# Patient Record
Sex: FEMALE | Race: WHITE | Employment: OTHER | URBAN - METROPOLITAN AREA
[De-identification: names, ages, dates, MRNs, and addresses within clinical notes are randomized per-mention and may not be internally consistent; named-entity substitution may affect disease eponyms.]

---

## 2019-10-10 ENCOUNTER — TRANSFERRED RECORDS (OUTPATIENT)
Dept: PHYSICAL THERAPY | Facility: CLINIC | Age: 84
End: 2019-10-10

## 2019-10-28 ENCOUNTER — THERAPY VISIT (OUTPATIENT)
Dept: PHYSICAL THERAPY | Facility: CLINIC | Age: 84
End: 2019-10-28
Payer: MEDICARE

## 2019-10-28 DIAGNOSIS — G89.29 CHRONIC LEFT-SIDED LOW BACK PAIN WITHOUT SCIATICA: ICD-10-CM

## 2019-10-28 DIAGNOSIS — M54.50 CHRONIC LEFT-SIDED LOW BACK PAIN WITHOUT SCIATICA: ICD-10-CM

## 2019-10-28 PROCEDURE — 97163 PT EVAL HIGH COMPLEX 45 MIN: CPT | Mod: GP | Performed by: PHYSICAL THERAPIST

## 2019-10-28 PROCEDURE — 97112 NEUROMUSCULAR REEDUCATION: CPT | Mod: GP | Performed by: PHYSICAL THERAPIST

## 2019-10-28 NOTE — PROGRESS NOTES
[facility] Initial Evaluation  Subjective:  The history is provided by the patient and a relative (daughter, Heather). 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 No  was used.   Lilliana Mcneal being seen for low back pain.   Problem began 10/10/2019 (date of MD order). Problem occurred: unknown reasons.  Noticed gradual onset of LBP approximately 2 months ago  General health as reported by patient is good. Pertinent medical history includes:  Smoking, stroke, sleep disorder/apnea, implanted device and other (A-Fib, Pacemaker, R hemiplegia).  Medical allergies: none.  Surgeries include:  None.  Current medications:  Heparin/coumadin, bone density, high blood pressure medication and pain medication.     Pain is described as sharp and stabbing (rated as 8/10) and is intermittent. Pain is the same all the time. Since onset symptoms are unchanged. Special tests:  X-ray (severe scoliosis and DJD).     Patient is Retired; very sedentary; spends a lot of time sitting.   Barriers include:  Requires assistance with ADL's (lives in her own home with daughter ).  Red flags:  None as reported by patient.    This is a chronic condition    Patient reports pain:  Lumbar spine left. Radiates to:  No radiation.  Symptoms are exacerbated by sitting and relieved by heat (Tramadol).    Patient's Goals/Expectations: to get rid of some of the pain                  Objective:  Standing Alignment:    Cervical/Thoracic:  Forward head and thoracic kyphosis increased (severe convex R thoracic scoliosis; left rib appears to be abutting into left iliac crest when sitting and standing)  Shoulder/upper extremity deviations alignment: severe subluxation of R shoulder with + sulcus sign; RUE posturing in shoulder IR, forearm pronation, and wrist flexion.  Lumbar:  Lordosis decr  Pelvic:  Iliac crest high R and PSIS high R          Gait:  Patient presented to the clinic in a WC;  "reportedly uses a walker in the home.  Requires 1-2 person assist with transfers due to R hemiplegia.  Wears right AFO           Lumbar AROM: deferred    Hip PROM: WFL bilateral LEs    Palpation: non-specific tenderness right low back and flank region just above iliac crest; pain alleviated with correction into more \"upright\" posture using towel roll    Physical Exam    General     ROS    Assessment/Plan:    Patient is a 87 year old female with lumbar complaints.    Patient has the following significant findings with corresponding treatment plan.                Diagnosis 1:  LBP   Pain -  self management, education and home program  Decreased function - home program  Decreased Strength - therapeutic exercises  Impaired posture - neuro re-education    Therapy Evaluation Codes:   1) History comprised of:   Personal factors that impact the plan of care:      Age and Cognition.    Comorbidity factors that impact the plan of care are:      Heart problems, Implanted device, Sleep disorder/apnea, Smoking, Stroke, Weakness and R hemiplegia.     Medications impacting care: Bone density, High blood pressure and Heparin/coumadin.  2) Examination of Body Systems comprised of:   Body structures and functions that impact the plan of care:      Lumbar spine.   Activity limitations that impact the plan of care are:      Sitting, Standing and Walking.  3) Clinical presentation characteristics are:   Evolving/Changing.  4) Decision-Making    High complexity using standardized patient assessment instrument and/or measureable assessment of functional outcome.  Cumulative Therapy Evaluation is: High complexity.    Previous and current functional limitations:  (See Goal Flow Sheet for this information)    Short term and Long term goals: (See Goal Flow Sheet for this information)     Communication ability:  Patient appears to be able to clearly communicate and understand verbal and written communication and follow directions " correctly.  Treatment Explanation - The following has been discussed with the patient:    RX ordered/plan of care  Anticipated outcomes  Possible risks and side effects  This patient would benefit from PT intervention to resume normal activities.   Rehab potential is fair.    Frequency:  1 X week, once daily  Duration:  for 6 weeks  Discharge Plan:  Achieve all LTG.  Independent in home treatment program.  Reach maximal therapeutic benefit.    Please refer to the daily flowsheet for treatment today, total treatment time and time spent performing 1:1 timed codes.

## 2019-10-28 NOTE — LETTER
DEPARTMENT OF HEALTH AND HUMAN SERVICES  [facility] MEDICA[facility] SERVICES    PLAN/UPDATED PLAN OF PROGRESS FOR OUTPATIENT REHABILITATION    PATIENTS NAME:  Lilliana Mcneal   : 1932  PROVIDER NUMBER:    [id number]  HICN:   [id number]  PROVIDER NAME: DASIA KITCHEN  MEDICAL RECORD NUMBER: [id number]   START OF CARE DATE:  SOC Date: 10/28/19   TYPE:  PT  PRIMARY/TREATMENT DIAGNOSIS: (Pertinent Medical Diagnosis)  Chronic left-sided low back pain without sciatica    VISITS FROM START OF CARE:  Rxs Used: 1     [facility] Initial Evaluation  Subjective:  The history is provided by the patient and a relative (Heather zuniga). 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 No  was used.   Lilliana Mcneal being seen for low back pain.   Problem began 10/10/2019 (date of MD order). Problem occurred: unknown reasons.  Noticed gradual onset of LBP approximately 2 months ago  General health as reported by patient is good. Pertinent medical history includes:  Smoking, stroke, sleep disorder/apnea, implanted device and other (A-Fib, Pacemaker, R hemiplegia).  Medical allergies: none.  Surgeries include:  None.  Current medications:  Heparin/coumadin, bone density, high blood pressure medication and pain medication.  Pain is described as sharp and stabbing (rated as 8/10) and is intermittent. Pain is the same all the time. Since onset symptoms are unchanged. Special tests:  X-ray (severe scoliosis and DJD). Patient is Retired; very sedentary; spends a lot of time sitting.   Barriers include:  Requires assistance with ADL's (lives in her own home with daughter ).  Red flags:  None as reported by patient.  This is a chronic condition    Patient reports pain:  Lumbar spine left. Radiates to:  No radiation.  Symptoms are exacerbated by sitting and relieved by heat (Tramadol).    Patient's Goals/Expectations: to get rid of some of the pain               "  Objective:  Standing Alignment:    Cervical/Thoracic:  Forward head and thoracic kyphosis increased (severe convex R thoracic scoliosis; left rib appears to be abutting into left iliac crest when sitting and standing)  Shoulder/upper extremity deviations alignment: severe subluxation of R shoulder with + sulcus sign; RUE posturing in shoulder IR, forearm pronation, and wrist flexion.  Lumbar:  Lordosis decr  Pelvic:  Iliac crest high R and PSIS high R  Gait:  Patient presented to the clinic in a WC; reportedly uses a walker in the home.  Requires 1-2 person assist with transfers due to R hemiplegia.  Wears right AFO   Lumbar AROM: deferred  Hip PROM: WFL bilateral LEs  Palpation: non-specific tenderness right low back and flank region just above iliac crest; pain alleviated with correction into more \"upright\" posture using towel roll      PATIENTS NAME:  Lilliana Mcneal   : 1932    Assessment/Plan:    Patient is a 87 year old female with lumbar complaints.    Patient has the following significant findings with corresponding treatment plan.                Diagnosis 1:  LBP   Pain -  self management, education and home program  Decreased function - home program  Decreased Strength - therapeutic exercises  Impaired posture - neuro re-education    Therapy Evaluation Codes:   1) History comprised of:   Personal factors that impact the plan of care:      Age and Cognition.    Comorbidity factors that impact the plan of care are:      Heart problems, Implanted device, Sleep disorder/apnea, Smoking, Stroke, Weakness and R hemiplegia.     Medications impacting care: Bone density, High blood pressure and Heparin/coumadin.  2) Examination of Body Systems comprised of:   Body structures and functions that impact the plan of care:      Lumbar spine.   Activity limitations that impact the plan of care are:      Sitting, Standing and Walking.  3) Clinical presentation characteristics " "are:   Evolving/Changing.  4) Decision-Making    High complexity using standardized patient assessment instrument and/or measureable assessment of functional outcome.  Cumulative Therapy Evaluation is: High complexity.  Previous and current functional limitations:  (See Goal Flow Sheet for this information)    Short term and Long term goals: (See Goal Flow Sheet for this information)   Communication ability:  Patient appears to be able to clearly communicate and understand verbal and written communication and follow directions correctly.  Treatment Explanation - The following has been discussed with the patient:    RX ordered/plan of care  Anticipated outcomes  Possible risks and side effects  This patient would benefit from PT intervention to resume normal activities.   Rehab potential is fair.  Frequency:  1 X week, once daily  Duration:  for 6 weeks  Discharge Plan:  Achieve all LTG.  Independent in home treatment program.  Reach maximal therapeutic benefit.        Caregiver Signature/Credentials _____________________________ Date ________      Treating Provider: Vero Stokes ,PT, OCS   I have reviewed and certified the need for these services and plan of treatment while under my care.        PHYSICIAN'S SIGNATURE:   _________________________________________  Date___________   Nabil Hatch M.D.    Certification period:  Beginning of Cert date period: 10/28/19 to  End of Cert period date: 12/27/19     Functional Level Progress Report: Please see attached \"Goal Flow sheet for Functional level.\"    ____X____ Continue Services or       ________ DC Services                Service dates: From  SOC Date: 10/28/19 date to present                         "

## 2019-12-18 PROBLEM — M54.50 CHRONIC LEFT-SIDED LOW BACK PAIN WITHOUT SCIATICA: Status: RESOLVED | Noted: 2019-10-28 | Resolved: 2019-12-18

## 2019-12-18 PROBLEM — G89.29 CHRONIC LEFT-SIDED LOW BACK PAIN WITHOUT SCIATICA: Status: RESOLVED | Noted: 2019-10-28 | Resolved: 2019-12-18

## 2021-02-14 ENCOUNTER — HEALTH MAINTENANCE LETTER (OUTPATIENT)
Age: 86
End: 2021-02-14

## 2021-02-18 ENCOUNTER — IMMUNIZATION (OUTPATIENT)
Dept: PEDIATRICS | Facility: CLINIC | Age: 86
End: 2021-02-18
Payer: MEDICARE

## 2021-02-18 PROCEDURE — 0001A PR COVID VAC PFIZER DIL RECON 30 MCG/0.3 ML IM: CPT

## 2021-02-18 PROCEDURE — 91300 PR COVID VAC PFIZER DIL RECON 30 MCG/0.3 ML IM: CPT

## 2021-03-11 ENCOUNTER — IMMUNIZATION (OUTPATIENT)
Dept: PEDIATRICS | Facility: CLINIC | Age: 86
End: 2021-03-11
Attending: INTERNAL MEDICINE
Payer: MEDICARE

## 2021-03-11 PROCEDURE — 91300 PR COVID VAC PFIZER DIL RECON 30 MCG/0.3 ML IM: CPT

## 2021-03-11 PROCEDURE — 0002A PR COVID VAC PFIZER DIL RECON 30 MCG/0.3 ML IM: CPT

## 2021-09-19 ENCOUNTER — HEALTH MAINTENANCE LETTER (OUTPATIENT)
Age: 86
End: 2021-09-19

## 2021-10-13 ENCOUNTER — LAB REQUISITION (OUTPATIENT)
Dept: LAB | Facility: CLINIC | Age: 86
End: 2021-10-13
Payer: MEDICARE

## 2021-10-13 DIAGNOSIS — U07.1 COVID-19: ICD-10-CM

## 2021-10-13 PROCEDURE — U0003 INFECTIOUS AGENT DETECTION BY NUCLEIC ACID (DNA OR RNA); SEVERE ACUTE RESPIRATORY SYNDROME CORONAVIRUS 2 (SARS-COV-2) (CORONAVIRUS DISEASE [COVID-19]), AMPLIFIED PROBE TECHNIQUE, MAKING USE OF HIGH THROUGHPUT TECHNOLOGIES AS DESCRIBED BY CMS-2020-01-R: HCPCS | Mod: ORL | Performed by: FAMILY MEDICINE

## 2021-10-16 LAB — SARS-COV-2 RNA RESP QL NAA+PROBE: NOT DETECTED

## 2021-11-02 ENCOUNTER — ASSISTED LIVING VISIT (OUTPATIENT)
Dept: GERIATRICS | Facility: CLINIC | Age: 86
End: 2021-11-02
Payer: MEDICARE

## 2021-11-02 VITALS
WEIGHT: 132 LBS | RESPIRATION RATE: 16 BRPM | DIASTOLIC BLOOD PRESSURE: 61 MMHG | HEART RATE: 74 BPM | TEMPERATURE: 97.9 F | SYSTOLIC BLOOD PRESSURE: 144 MMHG

## 2021-11-02 DIAGNOSIS — Z86.73 HISTORY OF CVA (CEREBROVASCULAR ACCIDENT): Primary | ICD-10-CM

## 2021-11-02 DIAGNOSIS — I10 BENIGN ESSENTIAL HYPERTENSION: ICD-10-CM

## 2021-11-02 DIAGNOSIS — K59.00 CONSTIPATION, UNSPECIFIED CONSTIPATION TYPE: ICD-10-CM

## 2021-11-02 DIAGNOSIS — E05.90 HYPERTHYROIDISM: ICD-10-CM

## 2021-11-02 DIAGNOSIS — I48.91 ATRIAL FIBRILLATION, UNSPECIFIED TYPE (H): ICD-10-CM

## 2021-11-02 DIAGNOSIS — S06.9X9D TRAUMATIC BRAIN INJURY WITH LOSS OF CONSCIOUSNESS, SUBSEQUENT ENCOUNTER: ICD-10-CM

## 2021-11-02 DIAGNOSIS — G47.00 INSOMNIA, UNSPECIFIED TYPE: ICD-10-CM

## 2021-11-02 PROCEDURE — 99207 PR CDG-MDM COMPONENT: MEETS LOW - DOWN CODED: CPT | Performed by: NURSE PRACTITIONER

## 2021-11-02 RX ORDER — ACETAMINOPHEN 500 MG
1000 TABLET ORAL DAILY PRN
COMMUNITY
End: 2021-11-21

## 2021-11-02 RX ORDER — AMLODIPINE BESYLATE 2.5 MG/1
2.5 TABLET ORAL DAILY
COMMUNITY
End: 2021-11-21

## 2021-11-02 RX ORDER — POLYETHYLENE GLYCOL 3350 17 G/17G
1 POWDER, FOR SOLUTION ORAL DAILY PRN
COMMUNITY
End: 2021-12-13

## 2021-11-02 RX ORDER — METOPROLOL SUCCINATE 25 MG/1
50 TABLET, EXTENDED RELEASE ORAL DAILY
COMMUNITY

## 2021-11-02 RX ORDER — MV-MIN/FA/VIT K/LUTEIN/ZEAXANT 200MCG-5MG
1 CAPSULE ORAL 2 TIMES DAILY
COMMUNITY

## 2021-11-02 RX ORDER — METHIMAZOLE 5 MG/1
2.5 TABLET ORAL DAILY
COMMUNITY

## 2021-11-02 RX ORDER — ACETAMINOPHEN 500 MG
1000 TABLET ORAL AT BEDTIME
COMMUNITY

## 2021-11-02 RX ORDER — FUROSEMIDE 20 MG
20 TABLET ORAL DAILY
COMMUNITY

## 2021-11-02 RX ORDER — ASPIRIN 81 MG/1
81 TABLET ORAL DAILY
COMMUNITY

## 2021-11-02 RX ORDER — MULTIPLE VITAMINS W/ MINERALS TAB 9MG-400MCG
1 TAB ORAL DAILY
COMMUNITY

## 2021-11-02 NOTE — LETTER
"    11/2/2021        RE: Lilliana Mcneal  [address]  [place] [address]        [facility]  PRIMARY CARE PROVIDER AND CLINIC:  Laya Moore, JENNIFER CNP, [address] / [place] [address]  Chief Complaint   Patient presents with     [facility] Medical Record Number:  [id number]  Place of Service where encounter took place:  [facility] [[id number]]    Lilliana Mcneal  is a 89 year old  (1/30/1932), admitted to the above facility from  [facility]. Hospital stay 8/3/21 through 8/5/21. Followed by a TCU stay at [facility] 8/5/21 through 9/12/21. Discharged home with her daughter until being admitted to [facility] on 10/7/21.     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 8/3/21 through 8/5/21 after a fall with an altered level of consciousness. A head CT revealed \"hyperdense focus in the right frontal region laterally which could be an acute small hemorrhagic contusion or acute subarachnoid hemorrhage. Alternatively, could represent noncalcified meningioma. 2. Calcified meningioma left frontal lobe\". INR 2.2 and reversed with FFP and Vitamin K. Neurosurgery was consulted. Recommended no anticoagulation. Non operative. Repeat head CT stable. Required supplemental Oxygen for mild hypoxic respiratory failure. IV fluids administered or mild JIGAR. Discharged to [facility] TCU. Ambulating 80 feet with hemiwalker. Minimal assistance with transfers. Set-up for feeding and grooming. Unable to complete cognitive testing due to aphasia. Discharged home with daughter and home health.    Today, patient is laying in her bed. Able to sit up " in bed with the assistance of a side rail. Only able to answer yes or no to questions due to aphasia. Denies pain or shortness of breath.     CODE STATUS/ADVANCE DIRECTIVES DISCUSSION: DNR / DNI  ALLERGIES:   Allergies   Allergen Reactions     Clindamycin      Fosamax [Alendronic Acid]       PAST MEDICAL HISTORY:   Past Medical History:   Diagnosis Date     Benign essential hypertension      Chronic atrial fibrillation (H)      Expressive aphasia      History of CVA (cerebrovascular accident)      Subarachnoid hemorrhage (H) 08/2021      PAST SURGICAL HISTORY:   has no past surgical history on file.  FAMILY HISTORY: family history is not on file.  SOCIAL HISTORY:     Patient's living condition: lives in an assisted living facility    Post Discharge Medication Reconciliation Status: discharge medications reconciled, continue medications without change  Current Outpatient Medications   Medication Sig     acetaminophen (TYLENOL) 500 MG tablet Take 1,000 mg by mouth At Bedtime     aspirin 81 MG EC tablet Take 81 mg by mouth daily     Caffeine-Magnesium Salicylate (DIUREX PO) Take 50 mg by mouth daily as needed     calcium carbonate 600 mg-vitamin D 400 units (CALCIUM + VITAMIN D3) 600-400 MG-UNIT per tablet Take 1 tablet by mouth 2 times daily     doxylamine (UNISOM) 25 MG TABS tablet Take 25 mg by mouth nightly as needed      furosemide (LASIX) 20 MG tablet Take 20 mg by mouth daily     melatonin 5 MG tablet Take 5 mg by mouth At Bedtime     methimazole (TAPAZOLE) 5 MG tablet Take 2.5 mg by mouth daily     metoprolol succinate ER (TOPROL-XL) 25 MG 24 hr tablet Take 50 mg by mouth daily     Multiple Vitamins-Minerals (PRESERVISION AREDS 2+MULTI VIT) CAPS Take 1 capsule by mouth 2 times daily     multivitamin w/minerals (THERA-VIT-M) tablet Take 1 tablet by mouth daily     polyethylene glycol (MIRALAX) 17 g packet Take 1 packet by mouth daily as needed for constipation     WARFARIN SODIUM PO See facility for orders      No current facility-administered medications for this visit.     ROS:  Unobtainable secondary to aphasia.     Vitals:  BP (!) 144/61   Pulse 74   Temp 97.9  F (36.6  C)   Resp 16   Wt 59.9 kg (132 lb)   Exam:  GENERAL APPEARANCE:  Alert, in no distress  ENT:  Slight right-sided facial droop  EYES:  EOM, conjunctivae, lids, pupils and irises normal  RESP:  respiratory effort and palpation of chest normal, lungs clear to auscultation , no respiratory distress  CV:  Palpation and auscultation of heart done , regular rate and rhythm, no murmur, rub, or gallop  ABDOMEN:  normal bowel sounds, soft, nontender, no hepatosplenomegaly or other masses  M/S:   RIght-sided hemiparesis  SKIN:  Inspection of skin and subcutaneous tissue baseline, Palpation of skin and subcutaneous tissue baseline  NEURO:   Cranial nerves 2-12 are normal tested and grossly at patient's baseline  PSYCH:  affect and mood normal    Lab/Diagnostic data:  Labs done in SNF are in [facility]. Please refer to them using [employer] Everywhere.    ASSESSMENT/PLAN:  Recent Subarachnoid Hemorrhage. Following a fall. Neurosurgery noted non-operative. Held anticoagulation, but has since been resumed. Discharged from TCU on 9/12/21 after completing therapy. Received home therapy.     History of CVA with Right-Sided Weakness, Facial Droop and Expressive Aphasia. Replies with one word responses to questions.     Chronic Atrial Fibrillation and History of Sick Sinus Syndrome S/P Dual-Chamber Pacemaker Placement. Anticoagulation held due to above and high risk of falls, but has since been resumed. Monitor INR and dose Warfarin accordingly. Also on Aspirin.Takes Metoprolol.     Hypertension. Monitor blood pressure monthly. Takes Metoprolol. Also on Furosemide and Diurex as needed.    Hyperthyroidism. No recent TSH available to review. Takes Methimazole.     Insomnia. Continue Melatonin as ordered. Taking Doxylamine. Will discuss with daughter and patient  tapering off medication given side effects and risk of falls.    Hypernatremia. Last Sodium on file 148 on 8/4/21. Will need to obtain repeat Sodium level to ensure stability.    Constipation. Continue Miralax as ordered.    Orders:  None    Electronically signed by:  JENNIFER Mcgrath CNP                     Sincerely,        JENNIFER Mcgrath CNP

## 2021-11-02 NOTE — PROGRESS NOTES
"[facility]  PRIMARY CARE PROVIDER AND CLINIC:  Laya Moore, JENNIFER CNP, [address] [facility] [address] / [facility] [address]  Chief Complaint   Patient presents with     [facility] Medical Record Number:  [id number]  Place of Service where encounter took place:  [facility] [[id number]]    Lilliana Mcneal  is a 89 year old  (1/30/1932), admitted to the above facility from  [facility]. Hospital stay 8/3/21 through 8/5/21. Followed by a TCU stay at [facility] 8/5/21 through 9/12/21. Discharged home with her daughter until being admitted to [facility] on 10/7/21.     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 A head CT revealed \"hyperdense focus in the right frontal region laterally which could be an acute small hemorrhagic contusion or acute subarachnoid hemorrhage. Alternatively, could represent noncalcified meningioma. 2. Calcified meningioma left frontal lobe\". INR 2.2 and reversed with FFP and Vitamin K. Neurosurgery was consulted. Recommended no anticoagulation. Non operative. Repeat head CT stable. Required supplemental Oxygen for mild hypoxic respiratory failure. IV fluids administered or mild JIGAR. Discharged to [facility] TCU. Ambulating 80 feet with hemiwalker. Minimal assistance with transfers. Set-up for feeding and grooming. Unable to complete cognitive testing due to aphasia. Discharged home with daughter and home health.    Today, patient is laying in her bed. Able to sit up in bed with the assistance of a side rail. Only able to answer yes or no to questions due to " aphasia. Denies pain or shortness of breath.     CODE STATUS/ADVANCE DIRECTIVES DISCUSSION: DNR / DNI  ALLERGIES:   Allergies   Allergen Reactions     Clindamycin      Fosamax [Alendronic Acid]       PAST MEDICAL HISTORY:   Past Medical History:   Diagnosis Date     Benign essential hypertension      Chronic atrial fibrillation (H)      Expressive aphasia      History of CVA (cerebrovascular accident)      Subarachnoid hemorrhage (H) 08/2021      PAST SURGICAL HISTORY:   has no past surgical history on file.  FAMILY HISTORY: family history is not on file.  SOCIAL HISTORY:     Patient's living condition: lives in an assisted living facility    Post Discharge Medication Reconciliation Status: discharge medications reconciled, continue medications without change  Current Outpatient Medications   Medication Sig     acetaminophen (TYLENOL) 500 MG tablet Take 1,000 mg by mouth At Bedtime     aspirin 81 MG EC tablet Take 81 mg by mouth daily     Caffeine-Magnesium Salicylate (DIUREX PO) Take 50 mg by mouth daily as needed     calcium carbonate 600 mg-vitamin D 400 units (CALCIUM + VITAMIN D3) 600-400 MG-UNIT per tablet Take 1 tablet by mouth 2 times daily     doxylamine (UNISOM) 25 MG TABS tablet Take 25 mg by mouth nightly as needed      furosemide (LASIX) 20 MG tablet Take 20 mg by mouth daily     melatonin 5 MG tablet Take 5 mg by mouth At Bedtime     methimazole (TAPAZOLE) 5 MG tablet Take 2.5 mg by mouth daily     metoprolol succinate ER (TOPROL-XL) 25 MG 24 hr tablet Take 50 mg by mouth daily     Multiple Vitamins-Minerals (PRESERVISION AREDS 2+MULTI VIT) CAPS Take 1 capsule by mouth 2 times daily     multivitamin w/minerals (THERA-VIT-M) tablet Take 1 tablet by mouth daily     polyethylene glycol (MIRALAX) 17 g packet Take 1 packet by mouth daily as needed for constipation     WARFARIN SODIUM PO See facility for orders     No current facility-administered medications for this visit.     ROS:  Unobtainable secondary  to aphasia.     Vitals:  BP (!) 144/61   Pulse 74   Temp 97.9  F (36.6  C)   Resp 16   Wt 59.9 kg (132 lb)   Exam:  GENERAL APPEARANCE:  Alert, in no distress  ENT:  Slight right-sided facial droop  EYES:  EOM, conjunctivae, lids, pupils and irises normal  RESP:  respiratory effort and palpation of chest normal, lungs clear to auscultation , no respiratory distress  CV:  Palpation and auscultation of heart done , regular rate and rhythm, no murmur, rub, or gallop  ABDOMEN:  normal bowel sounds, soft, nontender, no hepatosplenomegaly or other masses  M/S:   RIght-sided hemiparesis  SKIN:  Inspection of skin and subcutaneous tissue baseline, Palpation of skin and subcutaneous tissue baseline  NEURO:   Cranial nerves 2-12 are normal tested and grossly at patient's baseline  PSYCH:  affect and mood normal    Lab/Diagnostic data:  Labs done in SNF are in [facility]. Please refer to them using [employer] Everywhere.    ASSESSMENT/PLAN:  Recent Subarachnoid Hemorrhage. Following a fall. Neurosurgery noted non-operative. Held anticoagulation, but has since been resumed. Discharged from TCU on 9/12/21 after completing therapy. Received home therapy.     History of CVA with Right-Sided Weakness, Facial Droop and Expressive Aphasia. Replies with one word responses to questions.     Chronic Atrial Fibrillation and History of Sick Sinus Syndrome S/P Dual-Chamber Pacemaker Placement. Anticoagulation held due to above and high risk of falls, but has since been resumed. Monitor INR and dose Warfarin accordingly. Also on Aspirin.Takes Metoprolol.     Hypertension. Monitor blood pressure monthly. Takes Metoprolol. Also on Furosemide and Diurex as needed.    Hyperthyroidism. No recent TSH available to review. Takes Methimazole.     Insomnia. Continue Melatonin as ordered. Taking Doxylamine. Will discuss with daughter and patient tapering off medication given side effects and risk of falls.    Hypernatremia. Last Sodium on file  148 on 8/4/21. Will need to obtain repeat Sodium level to ensure stability.    Constipation. Continue Miralax as ordered.    Orders:  None    Electronically signed by:  JENNIFER Mcgrath CNP

## 2021-11-08 ENCOUNTER — DOCUMENTATION ONLY (OUTPATIENT)
Dept: OTHER | Facility: CLINIC | Age: 86
End: 2021-11-08
Payer: MEDICARE

## 2021-11-09 ENCOUNTER — TELEPHONE (OUTPATIENT)
Dept: GERIATRICS | Facility: CLINIC | Age: 86
End: 2021-11-09
Payer: MEDICARE

## 2021-11-09 NOTE — TELEPHONE ENCOUNTER
[place] [facility] TELEPHONE ENCOUNTER    Chief Complaint   Patient presents with     INR RESULTS     Lilliana Mcneal is a 89 year old  (1/30/1932),Nurse called today to report:  INR 2.0. Previous INR and dosing has been as follows:    Date INR New Dose/Orders   10/26/21 4.2 Warfarin 5 mg all other days   11/2/21 3.4 Warfarin 2.5 mg on Tu, Th and 5 mg PO all other days    11/9/21 2.0 Warfarin 5 mg PO every day      ASSESSMENT/PLAN  Atrial Fibrillation. INR therapeutic    Orders:    Warfarin 5 mg PO every day Dx: A. Fib    Repeat INR on 11/16/21 Dx: A. Fib    Electronically signed by:   JENNIFER Mcgrath CNP     Statement Selected

## 2021-11-16 ENCOUNTER — TELEPHONE (OUTPATIENT)
Dept: GERIATRICS | Facility: CLINIC | Age: 86
End: 2021-11-16
Payer: MEDICARE

## 2021-11-16 NOTE — TELEPHONE ENCOUNTER
[place] [facility] TELEPHONE ENCOUNTER    Chief Complaint   Patient presents with     INR RESULTS     Lilliana Mcneal is a 89 year old  (1/30/1932) Today, INR 2.9. Previous INR and Warfarin dosing has been as follows:    Date INR New Dose/Orders   10/26/21 4.2 Warfarin 5 mg all other days   11/2/21 3.4 Warfarin 2.5 mg on Tu, Th and 5 mg PO all other days    11/9/21 2.0 Warfarin 5 mg PO every day    11/16/21 2.9 Warfarin 5 mg PO every day      ASSESSMENT/PLAN  Atrial Fibrillation.    Orders:    Warfarin 5 mg PO every day Dx: A. Fib    Repeat INR on 11/30/21 Dx: A. Fib    Electronically signed by:   JENNIFER Mcgrath CNP

## 2021-11-23 ENCOUNTER — TELEPHONE (OUTPATIENT)
Dept: GERIATRICS | Facility: CLINIC | Age: 86
End: 2021-11-23
Payer: MEDICARE

## 2021-11-23 NOTE — TELEPHONE ENCOUNTER
[place] GERIATRIC SERVICES TELEPHONE ENCOUNTER    Chief Complaint   Patient presents with     Follow Up     Lillianatracie Mcneal is a 89 year old  (1/30/1932) Today, staff reported patient had a fall yesterday. Was found on the floor. She reported she was trying to get her pajamas on independently and then fell when putting on her pants. VSS. No injury.    Spoke to daughter, Heather, regarding fall and plan of care. Daughter reports patient tries to be independent and this is when she tends to fall. Reports PCP put patient back on Coumadin about 2 weeks after she was discharged from the TCU. Otherwise fells health wise, she is doing well. Requests monthly visits.     Educated daughter on side effects of Doxylamine. Recommended discontinuation and increase in Melatonin. Daughter is in agreement with plan.     ASSESSMENT/PLAN  Insomnia with Recent Fall    Discontinue Doxylamine    Increase Melatonin to 10 mg PO at bedtime    Electronically signed by:   JENNIFER Mcgrath CNP

## 2021-11-30 ENCOUNTER — TELEPHONE (OUTPATIENT)
Dept: GERIATRICS | Facility: CLINIC | Age: 86
End: 2021-11-30
Payer: MEDICARE

## 2021-11-30 NOTE — TELEPHONE ENCOUNTER
[place] GERIATRIC SERVICES TELEPHONE ENCOUNTER    Chief Complaint   Patient presents with     INR RESULTS     Lilliana Mcneal is a 89 year old  (1/30/1932),Nurse called today to report: INR 2.7. Previous INR and Warfarin dosing has been as follows:    Date INR New Dose/Orders   10/26/21 4.2 Warfarin 5 mg all other days   11/2/21 3.4 Warfarin 2.5 mg on Tu, Th and 5 mg PO all other days    11/9/21 2.0 Warfarin 5 mg PO every day    11/16/21 2.9 Warfarin 5 mg PO every day    11/30/21 2.7 Warfarin 5 mg PO every day      ASSESSMENT/PLAN  Atrial Fibrillation    Orders:    Warfarin 5 mg PO every day Dx: A. Fib    Repeat INR on 12/21/21 Dx: A. Fib    Electronically signed by:   JENNIFER Mcgrath CNP

## 2021-12-07 DIAGNOSIS — G47.00 INSOMNIA, UNSPECIFIED TYPE: Primary | ICD-10-CM

## 2021-12-08 ENCOUNTER — ASSISTED LIVING VISIT (OUTPATIENT)
Dept: GERIATRICS | Facility: CLINIC | Age: 86
End: 2021-12-08
Payer: MEDICARE

## 2021-12-08 VITALS
WEIGHT: 132 LBS | DIASTOLIC BLOOD PRESSURE: 57 MMHG | RESPIRATION RATE: 20 BRPM | SYSTOLIC BLOOD PRESSURE: 147 MMHG | HEART RATE: 83 BPM | TEMPERATURE: 95.1 F

## 2021-12-08 DIAGNOSIS — I10 BENIGN ESSENTIAL HYPERTENSION: ICD-10-CM

## 2021-12-08 DIAGNOSIS — G47.00 INSOMNIA, UNSPECIFIED TYPE: ICD-10-CM

## 2021-12-08 DIAGNOSIS — Z91.81 HISTORY OF RECENT FALL: Primary | ICD-10-CM

## 2021-12-08 NOTE — LETTER
"    12/8/2021        RE: Lilliana Mcneal  [id number] [place] [id number]        [facility] GERIATRIC SERVICES    Chief Complaint   Patient presents with     RECHECK     HPI:  Lilliana Mcneal is a 89 year old  (1/30/1932), who is being seen today for an episodic care visit at: [facility] ([facility]) [[id number]].     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 A head CT revealed \"hyperdense focus in the right frontal region laterally which could be an acute small hemorrhagic contusion or acute subarachnoid hemorrhage. Alternatively, could represent noncalcified meningioma. 2. Calcified meningioma left frontal lobe\". INR 2.2 and reversed with FFP and Vitamin K. Neurosurgery was consulted. Recommended no anticoagulation. Non operative. Repeat head CT stable. Required supplemental Oxygen for mild hypoxic respiratory failure. IV fluids administered or mild JIGAR. Discharged to [facility] TCU. Ambulating 80 feet with hemiwalker. Minimal assistance with transfers. Set-up for feeding and grooming. Unable to complete cognitive testing due to aphasia. Discharged home with daughter and home health.    Today's concern is:     Recent Fall. On 11/20/21, was found laying on the floor in a prone position, favoring her right side. Was attempting to put on pajamas independently and fell while putting her pants on. VSS. Today, patient denies pain.     Hypertension. Most recent blood pressure 147/57 on 11/20/21 and 129/64 on 11/14/21.    Insomnia. Doxylamine discontinued on 11/23/21 and Melatonin increased at that time. Today, no reports of issues with " sleep.    Allergies, and PMH/PSH reviewed in EPIC today.  REVIEW OF SYSTEMS:  Limited secondary to aphasia impairment but today pt reports no concerns    Objective:   BP (!) 147/57   Pulse 83   Temp (!) 95.1  F (35.1  C)   Resp 20   Wt 59.9 kg (132 lb)   GENERAL APPEARANCE:  Alert, in no distress  ENT:  Slight right-sided facial droop  EYES:  EOM, conjunctivae, lids, pupils and irises normal  RESP:  respiratory effort and palpation of chest normal, lungs clear to auscultation , no respiratory distress  CV:  Palpation and auscultation of heart done , regular rate and rhythm, no murmur, rub, or gallop  ABDOMEN:  normal bowel sounds, soft, nontender, no hepatosplenomegaly or other masses  M/S:   RIght-sided hemiparesis  SKIN:  Inspection of skin and subcutaneous tissue baseline, Palpation of skin and subcutaneous tissue baseline  NEURO:   Cranial nerves 2-12 are normal tested and grossly at patient's baseline  PSYCH:  affect and mood normal    Labs done in SNF are in [facility]. Please refer to them using [employer]Care Everywhere.    Assessment/Plan:  Recent Fall. While attempted to get dressed. No injury. VSS. Patient is at high risk for falls. Staff to assist with ADLs.    Recent Subarachnoid Hemorrhage. Following a fall. Neurosurgery noted non-operative. Held anticoagulation, but has since been resumed by previous PCP. Discharged from TCU on 9/12/21 after completing therapy. Received home therapy.      History of CVA with Right-Sided Weakness, Facial Droop and Expressive Aphasia. Replies with one word responses to questions.      Chronic Atrial Fibrillation and History of Sick Sinus Syndrome S/P Dual-Chamber Pacemaker Placement. Anticoagulation held due to above and high risk of falls, but has since been resumed by previous PCP. Monitor INR and dose Warfarin accordingly. Also on Aspirin.Takes Metoprolol.      Hypertension. Monitor blood pressure monthly. Takes Metoprolol. Also on Furosemide and Diurex as  needed.     Hyperthyroidism. No recent TSH available to review. Takes Methimazole.      Insomnia. Doxylamine discontinued and Melatonin increased on 11/23/21. No concerns noted during today's visit.     Hypernatremia. Last Sodium on file 148 on 8/4/21. Will need to obtain repeat Sodium level to ensure stability.     Constipation. Continue Miralax as ordered.    Orders:  None    Electronically signed by: JENNIFER Mcgrath CNP             Sincerely,        JENNIFER Mcgrath CNP

## 2021-12-08 NOTE — PROGRESS NOTES
"[facility] GERIATRIC SERVICES    Chief Complaint   Patient presents with     RECHECK     HPI:  Lilliana Mcneal is a 89 year old  (1/30/1932), who is being seen today for an [facility]) [[id number]].     Background:    This is an 89-year-old female, with a past medical history significant for a CVA with right-sided hemiparesis and expressive aphasia, chronic atrial fibrillation on anticoagulation, pacemaker placement and  hypertension, who was admitted to [facility] 8/3/21 through 8/5/21 after a fall with an altered level of consciousness. A head CT revealed \"hyperdense focus in the right frontal region laterally which could be an acute small hemorrhagic contusion or acute subarachnoid hemorrhage. Alternatively, could represent noncalcified meningioma. 2. Calcified meningioma left frontal lobe\". INR 2.2 and reversed with FFP and Vitamin K. Neurosurgery was consulted. Recommended no anticoagulation. Non operative. Repeat head CT stable. Required supplemental Oxygen for mild hypoxic respiratory failure. IV fluids administered or mild JIGAR. Discharged to [facility] TCU. Ambulating 80 feet with hemiwalker. Minimal assistance with transfers. Set-up for feeding and grooming. Unable to complete cognitive testing due to aphasia. Discharged home with daughter and home health.    Today's concern is:     Recent Fall. On 11/20/21, was found laying on the floor in a prone position, favoring her right side. Was attempting to put on pajamas independently and fell while putting her pants on. VSS. Today, patient denies pain.     Hypertension. Most recent blood pressure 147/57 on 11/20/21 and 129/64 on 11/14/21.    Insomnia. Doxylamine discontinued on 11/23/21 and Melatonin increased at that time. Today, no reports of issues with sleep.    Allergies, and PMH/PSH reviewed in EPIC today.  REVIEW OF SYSTEMS:  Limited secondary to aphasia " impairment but today pt reports no concerns    Objective:   BP (!) 147/57   Pulse 83   Temp (!) 95.1  F (35.1  C)   Resp 20   Wt 59.9 kg (132 lb)   GENERAL APPEARANCE:  Alert, in no distress  ENT:  Slight right-sided facial droop  EYES:  EOM, conjunctivae, lids, pupils and irises normal  RESP:  respiratory effort and palpation of chest normal, lungs clear to auscultation , no respiratory distress  CV:  Palpation and auscultation of heart done , regular rate and rhythm, no murmur, rub, or gallop  ABDOMEN:  normal bowel sounds, soft, nontender, no hepatosplenomegaly or other masses  M/S:   RIght-sided hemiparesis  SKIN:  Inspection of skin and subcutaneous tissue baseline, Palpation of skin and subcutaneous tissue baseline  NEURO:   Cranial nerves 2-12 are normal tested and grossly at patient's baseline  PSYCH:  affect and mood normal    Labs done in SNF are in [place] EPIC. Please refer to them using [employer]/Care Everywhere.    Assessment/Plan:  Recent Fall. While attempted to get dressed. No injury. VSS. Patient is at high risk for falls. Staff to assist with ADLs.    Recent Subarachnoid Hemorrhage. Following a fall. Neurosurgery noted non-operative. Held anticoagulation, but has since been resumed by previous PCP. Discharged from TCU on 9/12/21 after completing therapy. Received home therapy.      History of CVA with Right-Sided Weakness, Facial Droop and Expressive Aphasia. Replies with one word responses to questions.      Chronic Atrial Fibrillation and History of Sick Sinus Syndrome S/P Dual-Chamber Pacemaker Placement. Anticoagulation held due to above and high risk of falls, but has since been resumed by previous PCP. Monitor INR and dose Warfarin accordingly. Also on Aspirin.Takes Metoprolol.      Hypertension. Monitor blood pressure monthly. Takes Metoprolol. Also on Furosemide and Diurex as needed.     Hyperthyroidism. No recent TSH available to review. Takes Methimazole.      Insomnia. Doxylamine  discontinued and Melatonin increased on 11/23/21. No concerns noted during today's visit.     Hypernatremia. Last Sodium on file 148 on 8/4/21. Will need to obtain repeat Sodium level to ensure stability.     Constipation. Continue Miralax as ordered.    Orders:  None    Electronically signed by: JENNIFER Mcgrath CNP

## 2021-12-13 DIAGNOSIS — K59.00 CONSTIPATION, UNSPECIFIED CONSTIPATION TYPE: Primary | ICD-10-CM

## 2021-12-13 RX ORDER — POLYETHYLENE GLYCOL 3350 17 G/17G
17 POWDER, FOR SOLUTION ORAL DAILY
Qty: 510 G | Refills: 3 | Status: SHIPPED | OUTPATIENT
Start: 2021-12-13 | End: 2022-07-25

## 2021-12-20 ENCOUNTER — TELEPHONE (OUTPATIENT)
Dept: GERIATRICS | Facility: CLINIC | Age: 86
End: 2021-12-20
Payer: MEDICARE

## 2021-12-20 ENCOUNTER — LAB REQUISITION (OUTPATIENT)
Dept: LAB | Facility: CLINIC | Age: 86
End: 2021-12-20
Payer: MEDICARE

## 2021-12-20 DIAGNOSIS — H66.90 ACUTE OTITIS MEDIA, UNSPECIFIED OTITIS MEDIA TYPE: Primary | ICD-10-CM

## 2021-12-20 RX ORDER — OFLOXACIN 3 MG/ML
10 SOLUTION AURICULAR (OTIC) DAILY
Qty: 2 ML | Refills: 0
Start: 2021-12-20 | End: 2022-01-18

## 2021-12-20 RX ORDER — OFLOXACIN 3 MG/ML
5 SOLUTION AURICULAR (OTIC) DAILY
Qty: 2 ML | Refills: 0
Start: 2021-12-20 | End: 2021-12-20

## 2021-12-20 NOTE — TELEPHONE ENCOUNTER
[place] GERIATRIC SERVICES TELEPHONE ENCOUNTER    Chief Complaint   Patient presents with     Otalgia     Lilliana Mcneal is a 89 year old  (1/30/1932),Nurse called today to report: patient is complaining of left ear pain. No reports of drainage.    New Orders:  Ofloxacin. Instill 10 drops into the left ear daily x 7 days Dx: Ear Infection  CBC, BMP, TSH, Free T4, Total T3 Dx: E05    Electronically signed by:   EJNNIFER Mcgrath CNP

## 2021-12-22 ENCOUNTER — TELEPHONE (OUTPATIENT)
Dept: GERIATRICS | Facility: CLINIC | Age: 86
End: 2021-12-22
Payer: MEDICARE

## 2021-12-22 NOTE — TELEPHONE ENCOUNTER
[place] GERIATRIC SERVICES TELEPHONE ENCOUNTER    Chief Complaint   Patient presents with     INR RESULTS     Lilliana Mcneal is a 89 year old  (1/30/1932) Today INR, 1.1. Has been receiving Warfarin 5 mg since 12/20/21, but had missed doses intermittently per facility report since 11/30/21. Previous INR and Warfarin dosing has been as follows:     Date INR New Dose/Orders   10/26/21 4.2 Warfarin 5 mg all other days   11/2/21 3.4 Warfarin 2.5 mg on Tu, Th and 5 mg PO all other days    11/9/21 2.0 Warfarin 5 mg PO every day    11/16/21 2.9 Warfarin 5 mg PO every day    11/30/21 2.7 Warfarin 5 mg PO every day    12/22/21 1.1 Warfarin 7.5 mg PO every day      ASSESSMENT/PLAN  Atrial Fibrillation    Warfarin 7.5 mg PO every day     Repeat INR on 12/24/21    Electronically signed by:   JENNIFER Mcgrath CNP

## 2021-12-23 ENCOUNTER — TRANSFERRED RECORDS (OUTPATIENT)
Dept: HEALTH INFORMATION MANAGEMENT | Facility: CLINIC | Age: 86
End: 2021-12-23

## 2021-12-23 LAB
ANION GAP SERPL CALCULATED.3IONS-SCNC: 11 MMOL/L (ref 5–18)
BUN SERPL-MCNC: 22 MG/DL (ref 8–28)
CALCIUM SERPL-MCNC: 9.6 MG/DL (ref 8.5–10.5)
CHLORIDE BLD-SCNC: 103 MMOL/L (ref 98–107)
CO2 SERPL-SCNC: 30 MMOL/L (ref 22–31)
CREAT SERPL-MCNC: 0.84 MG/DL (ref 0.6–1.1)
ERYTHROCYTE [DISTWIDTH] IN BLOOD BY AUTOMATED COUNT: 14.6 % (ref 10–15)
GFR SERPL CREATININE-BSD FRML MDRD: 66 ML/MIN/1.73M2
GLUCOSE BLD-MCNC: 140 MG/DL (ref 70–125)
HCT VFR BLD AUTO: 44.9 % (ref 35–47)
HGB BLD-MCNC: 14 G/DL (ref 11.7–15.7)
MCH RBC QN AUTO: 31.4 PG (ref 26.5–33)
MCHC RBC AUTO-ENTMCNC: 31.2 G/DL (ref 31.5–36.5)
MCV RBC AUTO: 101 FL (ref 78–100)
PLATELET # BLD AUTO: 194 10E3/UL (ref 150–450)
POTASSIUM BLD-SCNC: 4.1 MMOL/L (ref 3.5–5)
RBC # BLD AUTO: 4.46 10E6/UL (ref 3.8–5.2)
SODIUM SERPL-SCNC: 144 MMOL/L (ref 136–145)
T3 SERPL-MCNC: 68 NG/DL (ref 60–181)
T4 FREE SERPL-MCNC: 1.23 NG/DL (ref 0.7–1.8)
TSH SERPL DL<=0.005 MIU/L-ACNC: 2.63 UIU/ML (ref 0.3–5)
WBC # BLD AUTO: 6.9 10E3/UL (ref 4–11)

## 2021-12-23 PROCEDURE — 36415 COLL VENOUS BLD VENIPUNCTURE: CPT | Mod: ORL | Performed by: NURSE PRACTITIONER

## 2021-12-23 PROCEDURE — 80048 BASIC METABOLIC PNL TOTAL CA: CPT | Mod: ORL | Performed by: NURSE PRACTITIONER

## 2021-12-23 PROCEDURE — 85027 COMPLETE CBC AUTOMATED: CPT | Mod: ORL | Performed by: NURSE PRACTITIONER

## 2021-12-23 PROCEDURE — 84443 ASSAY THYROID STIM HORMONE: CPT | Mod: ORL | Performed by: NURSE PRACTITIONER

## 2021-12-23 PROCEDURE — 84439 ASSAY OF FREE THYROXINE: CPT | Mod: ORL | Performed by: NURSE PRACTITIONER

## 2021-12-23 PROCEDURE — P9603 ONE-WAY ALLOW PRORATED MILES: HCPCS | Mod: ORL | Performed by: NURSE PRACTITIONER

## 2021-12-23 PROCEDURE — 84480 ASSAY TRIIODOTHYRONINE (T3): CPT | Mod: ORL | Performed by: NURSE PRACTITIONER

## 2021-12-24 ENCOUNTER — TELEPHONE (OUTPATIENT)
Dept: GERIATRICS | Facility: CLINIC | Age: 86
End: 2021-12-24
Payer: MEDICARE

## 2021-12-24 NOTE — TELEPHONE ENCOUNTER
[place] GERIATRIC SERVICES TELEPHONE ENCOUNTER    Chief Complaint   Patient presents with     INR RESULTS     Lilliana Mcneal is a 89 year old  (1/30/1932),Today, INR 1.4. Previous INR and Warfarin dosing has been as follows:    Date INR New Dose/Orders   10/26/21 4.2 Warfarin 5 mg all other days   11/2/21 3.4 Warfarin 2.5 mg on Tu, Th and 5 mg PO all other days    11/9/21 2.0 Warfarin 5 mg PO every day    11/16/21 2.9 Warfarin 5 mg PO every day    11/30/21 2.7 Warfarin 5 mg PO every day    12/22/21 1.1 Warfarin 7.5 mg PO every day    12/24/21 1.4 Warfarin 7.5 mg PO on 12/24/21 then 5 mg PO every day thereafter     ASSESSMENT/PLAN  Atrial Fibrillation.  Warfarin 7.5 mg PO on 12/24/21 then 5 mg PO every day thereafter  Recheck INR on 12/27/21    Electronically signed by:   JENNIFER Mcgrath CNP

## 2021-12-27 ENCOUNTER — TELEPHONE (OUTPATIENT)
Dept: GERIATRICS | Facility: CLINIC | Age: 86
End: 2021-12-27
Payer: MEDICARE

## 2021-12-27 NOTE — TELEPHONE ENCOUNTER
[place] GERIATRIC SERVICES TELEPHONE ENCOUNTER    Chief Complaint   Patient presents with     INR RESULTS     Lilliana Mcneal is a 89 year old  (1/30/1932),Nurse called today to report: INR 2.5. Previous INR and Warfarin dosing has been as follows:    Date INR New Dose/Orders   10/26/21 4.2 Warfarin 5 mg all other days   11/2/21 3.4 Warfarin 2.5 mg on Tu, Th and 5 mg PO all other days    11/9/21 2.0 Warfarin 5 mg PO every day    11/16/21 2.9 Warfarin 5 mg PO every day    11/30/21 2.7 Warfarin 5 mg PO every day    12/22/21 1.1 Warfarin 7.5 mg PO every day    12/24/21 1.4 Warfarin 7.5 mg PO on 12/24/21 then 5 mg PO every day thereafter   12/27/21 2.5 Warfarin 5 mg PO every day       ASSESSMENT/PLAN  Atrial Fibrillation.    Warfarin 5 mg PO every day     INR on 12/30/21    Electronically signed by:   JENNIFER Mcgrath CNP

## 2021-12-30 ENCOUNTER — TELEPHONE (OUTPATIENT)
Dept: GERIATRICS | Facility: CLINIC | Age: 86
End: 2021-12-30
Payer: MEDICARE

## 2021-12-31 ENCOUNTER — TELEPHONE (OUTPATIENT)
Dept: GERIATRICS | Facility: CLINIC | Age: 86
End: 2021-12-31
Payer: MEDICARE

## 2021-12-31 NOTE — TELEPHONE ENCOUNTER
[place] GERIATRIC SERVICES ORDER:    Chief Complaint   Patient presents with     Medication Question     Lilliana Mcneal is a 89 year old  (1/30/1932)    ORDERS:    Ok to schedule Miralax 17  PO every day Dx: Constipation    Electronically signed by:   JENNIFER Mcgrath CNP

## 2021-12-31 NOTE — TELEPHONE ENCOUNTER
[place] GERIATRIC SERVICES TELEPHONE ENCOUNTER    Chief Complaint   Patient presents with     INR RESULTS     Lilliana Mcneal is a 89 year old  (1/30/1932) Today, patient's INR is 3.1 Previous INR and Warfarin dosing has been as follows:    Date INR New Dose/Orders   10/26/21 4.2 Warfarin 5 mg all other days   11/2/21 3.4 Warfarin 2.5 mg on Tu, Th and 5 mg PO all other days    11/9/21 2.0 Warfarin 5 mg PO every day    11/16/21 2.9 Warfarin 5 mg PO every day    11/30/21 2.7 Warfarin 5 mg PO every day    12/22/21 1.1 Warfarin 7.5 mg PO every day    12/24/21 1.4 Warfarin 7.5 mg PO on 12/24/21 then 5 mg PO every day thereafter   12/27/21 2.5 Warfarin 5 mg PO every day    12/30/21 3.1 Warfarin 2.5 mg on 12/30/21 then Warfarin 5 mg PO every day thereafter     ASSESSMENT/PLAN  Atrial Fibrillation.  - Warfarin 2.5 mg on 12/30/21 then Warfarin 5 mg PO every day thereafter  - Recheck INR on 1/3/22    Electronically signed by:   JENNIFER Mcgrath CNP

## 2022-01-03 ENCOUNTER — TELEPHONE (OUTPATIENT)
Dept: GERIATRICS | Facility: CLINIC | Age: 87
End: 2022-01-03
Payer: MEDICARE

## 2022-01-03 NOTE — TELEPHONE ENCOUNTER
[place] GERIATRIC SERVICES TELEPHONE ENCOUNTER    Chief Complaint   Patient presents with     INR RESULTS     Lilliana Mcneal is a 89 year old  (1/30/1932),Nurse communicated today INR 3.1 Previous INR and Warfarin dosing has been as follows:    Date INR New Dose/Orders   10/26/21 4.2 Warfarin 5 mg all other days   11/2/21 3.4 Warfarin 2.5 mg on Tu, Th and 5 mg PO all other days    11/9/21 2.0 Warfarin 5 mg PO every day    11/16/21 2.9 Warfarin 5 mg PO every day    11/30/21 2.7 Warfarin 5 mg PO every day    12/22/21 1.1 Warfarin 7.5 mg PO every day    12/24/21 1.4 Warfarin 7.5 mg PO on 12/24/21 then 5 mg PO every day thereafter   12/27/21 2.5 Warfarin 5 mg PO every day    12/30/21 3.1 Warfarin 2.5 mg on 12/30/21 then Warfarin 5 mg PO every day thereafter   1/3/22 3.1 Warfarin 2.5 mg on M and 5 mg all other days      ASSESSMENT/PLAN  Atrial Fibrillation  -Warfarin 2.5 mg on M and 5 mg all other days  -INR on 1/10/22     Electronically signed by:   JENNIFER Mcgrath CNP

## 2022-01-10 ENCOUNTER — TELEPHONE (OUTPATIENT)
Dept: GERIATRICS | Facility: CLINIC | Age: 87
End: 2022-01-10
Payer: MEDICARE

## 2022-01-10 NOTE — TELEPHONE ENCOUNTER
[place] GERIATRIC SERVICES TELEPHONE ENCOUNTER    Chief Complaint   Patient presents with     INR RESULTS     Lilliana Mcneal is a 89 year old  (1/30/1932),Nurse called today to report: INR 2.4. Previous INR and Warfarin dosing has been as follows:    Date INR New Dose/Orders   10/26/21 4.2 Warfarin 5 mg all other days   11/2/21 3.4 Warfarin 2.5 mg on Tu, Th and 5 mg PO all other days    11/9/21 2.0 Warfarin 5 mg PO every day    11/16/21 2.9 Warfarin 5 mg PO every day    11/30/21 2.7 Warfarin 5 mg PO every day    12/22/21 1.1 Warfarin 7.5 mg PO every day    12/24/21 1.4 Warfarin 7.5 mg PO on 12/24/21 then 5 mg PO every day thereafter   12/27/21 2.5 Warfarin 5 mg PO every day    12/30/21 3.1 Warfarin 2.5 mg on 12/30/21 then Warfarin 5 mg PO every day thereafter   1/3/22 3.1 Warfarin 2.5 mg on M and 5 mg all other days   1/10/22 2.4 Warfarin 2.5 mg on M and 5 mg all other days      ASSESSMENT/PLAN  Atrial Fibrillation    New Orders:    Warfarin 2.5 mg on M and 5 mg all other days    Repeat INR on 1/24/22    Electronically signed by:   JENNIFER Mcgrath CNP

## 2022-01-12 ENCOUNTER — ASSISTED LIVING VISIT (OUTPATIENT)
Dept: GERIATRICS | Facility: CLINIC | Age: 87
End: 2022-01-12
Payer: MEDICARE

## 2022-01-12 VITALS
HEART RATE: 83 BPM | DIASTOLIC BLOOD PRESSURE: 84 MMHG | SYSTOLIC BLOOD PRESSURE: 167 MMHG | TEMPERATURE: 96.1 F | WEIGHT: 132 LBS | RESPIRATION RATE: 22 BRPM

## 2022-01-12 DIAGNOSIS — I48.91 ATRIAL FIBRILLATION, UNSPECIFIED TYPE (H): ICD-10-CM

## 2022-01-12 DIAGNOSIS — Z91.81 PERSONAL HISTORY OF FALL: ICD-10-CM

## 2022-01-12 DIAGNOSIS — I69.351 HEMIPARESIS AFFECTING RIGHT SIDE AS LATE EFFECT OF CEREBROVASCULAR ACCIDENT (CVA) (H): ICD-10-CM

## 2022-01-12 DIAGNOSIS — I71.20 THORACIC AORTIC ANEURYSM WITHOUT RUPTURE (H): ICD-10-CM

## 2022-01-12 DIAGNOSIS — Z86.73 HISTORY OF CVA (CEREBROVASCULAR ACCIDENT): Primary | ICD-10-CM

## 2022-01-12 DIAGNOSIS — I10 BENIGN ESSENTIAL HYPERTENSION: ICD-10-CM

## 2022-01-12 DIAGNOSIS — E05.90 HYPERTHYROIDISM: ICD-10-CM

## 2022-01-12 NOTE — PROGRESS NOTES
"[facility] GERIATRIC SERVICES    Chief Complaint   Patient presents with     RECHECK     HPI:  Lilliana Mcneal is a 89 year old  (1/30/1932), who is being [facility] today [facility] [[id number]].     Background:    This is an 89-year-old female, with a past medical history significant for a CVA with right-sided hemiparesis and expressive aphasia, chronic atrial fibrillation on anticoagulation, pacemaker placement and  hypertension, who was admitted to [facility] 8/3/21 through 8/5/21 after a fall with an altered level of consciousness. A head CT revealed \"hyperdense focus in the right frontal region laterally which could be an acute small hemorrhagic contusion or acute subarachnoid hemorrhage. Alternatively, could represent noncalcified meningioma. 2. Calcified meningioma left frontal lobe\". INR 2.2 and reversed with FFP and Vitamin K. Neurosurgery was consulted. Recommended no anticoagulation. Non operative. Repeat head CT stable. Required supplemental Oxygen for mild hypoxic respiratory failure. IV fluids administered or mild JIGAR. Discharged to [facility] TCU. Ambulating 80 feet with hemiwalker. Minimal assistance with transfers. Set-up for feeding and grooming. Unable to complete cognitive testing due to aphasia. Discharged home with daughter and home health.    Today's concern is:     Hypernatremia. Sodium 148 on 8/4/21. Repeat Sodium 144 on 12/23/21.    Hyperthyroidism. TSH 2.63, T3 68 and Free T4 1.23 on 12/23/21. Limited weights available to review, 132 lbs on 10/12/21.    History of CVA with Right-Sided Weakness, Facial Droop and Expressive Aphasia in 2004. Denies pain during today's visit. Is using her stationary bike by the window in her apartment. Smiles throughout visit.    Allergies, and PMH/PSH reviewed in EPIC today.  REVIEW OF SYSTEMS:  Unobtainable secondary to aphasia.     Objective:   BP (!) 167/84   " Pulse 83   Temp (!) 96.1  F (35.6  C)   Resp 22   Wt 59.9 kg (132 lb)   GENERAL APPEARANCE:  Alert, in no distress  ENT:  Slight right-sided facial droop  EYES:  EOM, conjunctivae, lids, pupils and irises normal  RESP:  respiratory effort and palpation of chest normal, lungs clear to auscultation , no respiratory distress  CV:  Palpation and auscultation of heart done , regular rate and rhythm, no murmur, rub, or gallop  ABDOMEN:  normal bowel sounds, soft, nontender, no hepatosplenomegaly or other masses  M/S:   RIght-sided hemiparesis  SKIN:  Inspection of skin and subcutaneous tissue baseline, Palpation of skin and subcutaneous tissue baseline  NEURO:   Cranial nerves 2-12 are normal tested and grossly at patient's baseline  PSYCH:  affect and mood normal    Labs done in SNF are in [place] EPIC. Please refer to them using [employer]/Care Everywhere.    Assessment/Plan:  History of Falls. While attempting to get dressed on 11/20/21. No injury. VSS. Has had no other falls since that time. Patient is at high risk for falls. Staff to assist with ADLs.     Recent Subarachnoid Hemorrhage in August 2021. Following a fall. Neurosurgery recommended non-operative treatment. Held anticoagulation, but has since been resumed by previous PCP. Discharged from TCU on 9/12/21 after completing therapy. Received home therapy.      History of CVA with Right-Sided Weakness, Facial Droop and Expressive Aphasia in 2004. Is able to state yes or no to questions secondary to aphasia.     Chronic Atrial Fibrillation and History of Sick Sinus Syndrome S/P Dual-Chamber Pacemaker Placement. Anticoagulation held due to SAH in August 2021 and high risk of falls, but has since been resumed by previous PCP. Monitor INR and dose Warfarin accordingly. Also on Aspirin.Takes Metoprolol.      Hypertension. Monitor blood pressure monthly. Takes Metoprolol. Also on Furosemide and Diurex as needed.     Hyperthyroidism. TSH 2.63, T3 68 and Free T4 1.23 on  12/23/21.Takes Methimazole.      Insomnia. Doxylamine discontinued and Melatonin increased on 11/23/21. No concerns noted during today's visit.      Hypernatremia. Resolved. Last Sodium 144 on 12/23/21. Monitor periodically.     Constipation. Continue Miralax as ordered.    Thoracic Aortic Aneurysm. Incidental finding on 7/22/18. 4 cm in AP dimension. Will need to clarify with daughter if follow-up surveillance was performed depending upon goals of care.    Orders:  None    Electronically signed by: JENNIFER Mcgrath CNP

## 2022-01-12 NOTE — LETTER
"    1/12/2022        RE: Lilliana Mcneal  [address] [address] [facility] [address] [facility] [address]        [facility] GERIATRIC SERVICES    Chief Complaint   Patient presents with     RECHECK     HPI:  Lilliana Mcneal is a 89 year old  (1/30/1932), who is being seen today for an episodic care visit at: [facility] ([facility]) [[id number]].     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 A head CT revealed \"hyperdense focus in the right frontal region laterally which could be an acute small hemorrhagic contusion or acute subarachnoid hemorrhage. Alternatively, could represent noncalcified meningioma. 2. Calcified meningioma left frontal lobe\". INR 2.2 and reversed with FFP and Vitamin K. Neurosurgery was consulted. Recommended no anticoagulation. Non operative. Repeat head CT stable. Required supplemental Oxygen for mild hypoxic respiratory failure. IV fluids administered or mild JIGAR. Discharged to [facility] TCU. Ambulating 80 feet with hemiwalker. Minimal assistance with transfers. Set-up for feeding and grooming. Unable to complete cognitive testing due to aphasia. Discharged home with daughter and home health.    Today's concern is:     Hypernatremia. Sodium 148 on 8/4/21. Repeat Sodium 144 on 12/23/21.    Hyperthyroidism. TSH 2.63, T3 68 and Free T4 1.23 on 12/23/21. Limited weights available to review, 132 lbs on 10/12/21.    History of CVA with Right-Sided Weakness, Facial Droop and Expressive Aphasia in 2004. Denies pain during today's visit. Is using her stationary bike by the window in her apartment. Smiles throughout visit.    Allergies, and PMH/PSH reviewed in EPIC " today.  REVIEW OF SYSTEMS:  Unobtainable secondary to aphasia.     Objective:   BP (!) 167/84   Pulse 83   Temp (!) 96.1  F (35.6  C)   Resp 22   Wt 59.9 kg (132 lb)   GENERAL APPEARANCE:  Alert, in no distress  ENT:  Slight right-sided facial droop  EYES:  EOM, conjunctivae, lids, pupils and irises normal  RESP:  respiratory effort and palpation of chest normal, lungs clear to auscultation , no respiratory distress  CV:  Palpation and auscultation of heart done , regular rate and rhythm, no murmur, rub, or gallop  ABDOMEN:  normal bowel sounds, soft, nontender, no hepatosplenomegaly or other masses  M/S:   RIght-sided hemiparesis  SKIN:  Inspection of skin and subcutaneous tissue baseline, Palpation of skin and subcutaneous tissue baseline  NEURO:   Cranial nerves 2-12 are normal tested and grossly at patient's baseline  PSYCH:  affect and mood normal    Labs done in SNF are in [place] EPIC. Please refer to them using [employer]/Care Everywhere.    Assessment/Plan:  History of Falls. While attempting to get dressed on 11/20/21. No injury. VSS. Has had no other falls since that time. Patient is at high risk for falls. Staff to assist with ADLs.     Recent Subarachnoid Hemorrhage in August 2021. Following a fall. Neurosurgery recommended non-operative treatment. Held anticoagulation, but has since been resumed by previous PCP. Discharged from TCU on 9/12/21 after completing therapy. Received home therapy.      History of CVA with Right-Sided Weakness, Facial Droop and Expressive Aphasia in 2004. Is able to state yes or no to questions secondary to aphasia.     Chronic Atrial Fibrillation and History of Sick Sinus Syndrome S/P Dual-Chamber Pacemaker Placement. Anticoagulation held due to SAH in August 2021 and high risk of falls, but has since been resumed by previous PCP. Monitor INR and dose Warfarin accordingly. Also on Aspirin.Takes Metoprolol.      Hypertension. Monitor blood pressure monthly. Takes Metoprolol.  Also on Furosemide and Diurex as needed.     Hyperthyroidism. TSH 2.63, T3 68 and Free T4 1.23 on 12/23/21.Takes Methimazole.      Insomnia. Doxylamine discontinued and Melatonin increased on 11/23/21. No concerns noted during today's visit.      Hypernatremia. Resolved. Last Sodium 144 on 12/23/21. Monitor periodically.     Constipation. Continue Miralax as ordered.    Thoracic Aortic Aneurysm. Incidental finding on 7/22/18. 4 cm in AP dimension. Will need to clarify with daughter if follow-up surveillance was performed depending upon goals of care.    Orders:  None    Electronically signed by: JENNIFER Mcgrath CNP                   Sincerely,        JENNIFER Mcgrath CNP

## 2022-01-18 PROBLEM — I48.91 ATRIAL FIBRILLATION, UNSPECIFIED TYPE (H): Status: ACTIVE | Noted: 2022-01-18

## 2022-01-18 PROBLEM — I71.20 THORACIC AORTIC ANEURYSM WITHOUT RUPTURE (H): Status: ACTIVE | Noted: 2022-01-18

## 2022-01-18 PROBLEM — I69.351 HEMIPARESIS AFFECTING RIGHT SIDE AS LATE EFFECT OF CEREBROVASCULAR ACCIDENT (CVA) (H): Status: ACTIVE | Noted: 2022-01-18

## 2022-01-24 ENCOUNTER — TELEPHONE (OUTPATIENT)
Dept: GERIATRICS | Facility: CLINIC | Age: 87
End: 2022-01-24
Payer: MEDICARE

## 2022-01-24 NOTE — TELEPHONE ENCOUNTER
[place] GERIATRIC SERVICES TELEPHONE ENCOUNTER    Chief Complaint   Patient presents with     INR RESULTS     Lilliana Mcneal is a 89 year old  (1/30/1932),Nurse called today to report: INR 2.9. Previous INR and Warfarin dosing has been as follows:    Date INR New Dose/Orders   10/26/21 4.2 Warfarin 5 mg all other days   11/2/21 3.4 Warfarin 2.5 mg on Tu, Th and 5 mg PO all other days    11/9/21 2.0 Warfarin 5 mg PO every day    11/16/21 2.9 Warfarin 5 mg PO every day    11/30/21 2.7 Warfarin 5 mg PO every day    12/22/21 1.1 Warfarin 7.5 mg PO every day    12/24/21 1.4 Warfarin 7.5 mg PO on 12/24/21 then 5 mg PO every day thereafter   12/27/21 2.5 Warfarin 5 mg PO every day    12/30/21 3.1 Warfarin 2.5 mg on 12/30/21 then Warfarin 5 mg PO every day thereafter   1/3/22 3.1 Warfarin 2.5 mg on M and 5 mg all other days   1/10/22 2.4 Warfarin 2.5 mg on M and 5 mg all other days   1/24/22 2.9 Warfarin 2.5 mg on M and 5 mg all other days      ASSESSMENT/PLAN  Atrial Fibrillation.    Warfarin 2.5 mg on M and 5 mg all other days  INR on 2/14/22    Electronically signed by:   JENNIFER Mcgrath CNP

## 2022-02-14 ENCOUNTER — TELEPHONE (OUTPATIENT)
Dept: GERIATRICS | Facility: CLINIC | Age: 87
End: 2022-02-14
Payer: MEDICARE

## 2022-02-14 NOTE — TELEPHONE ENCOUNTER
[place] GERIATRIC SERVICES TELEPHONE ENCOUNTER    Chief Complaint   Patient presents with     INR RESULTS     Lilliana Mcneal is a 89 year old  (1/30/1932),Nurse called today to report: Today INR 2.6. Previous INR and Warfarin dosing is as follows:    1/10/22 2.4 Warfarin 2.5 mg on M and 5 mg all other days   1/24/22 2.9 Warfarin 2.5 mg on M and 5 mg all other days   2/14/22 2.6 Warfarin 2.5 mg on M and 5 mg all other days     ASSESSMENT/PLAN  Atrial Fibrillation.    Warfarin 2.5 mg on M and 5 mg all other days    INR on 3/14/22    Electronically signed by:   JENNIFER Mcgrath CNP

## 2022-02-16 ENCOUNTER — ASSISTED LIVING VISIT (OUTPATIENT)
Dept: GERIATRICS | Facility: CLINIC | Age: 87
End: 2022-02-16
Payer: MEDICARE

## 2022-02-16 VITALS
DIASTOLIC BLOOD PRESSURE: 84 MMHG | HEART RATE: 83 BPM | TEMPERATURE: 97 F | SYSTOLIC BLOOD PRESSURE: 167 MMHG | RESPIRATION RATE: 22 BRPM | WEIGHT: 132 LBS

## 2022-02-16 DIAGNOSIS — I48.91 ATRIAL FIBRILLATION, UNSPECIFIED TYPE (H): Primary | ICD-10-CM

## 2022-02-16 NOTE — PROGRESS NOTES
"[facility] GERIATRICS    Chief Complaint   Patient presents with     RECHECK     HPI:  Lilliana Mcneal is a 89 year old  (1/30/1932), who is being seen today for an episodic care visit at: [facility] [[id number]].    Background:    This is a 89-year-old female, with a past medical history significant for a CVA with right-sided hemiparesis and expressive aphasia, chronic atrial fibrillation on anticoagulation, pacemaker placement and  hypertension, who was admitted to [facility] 8/3/21 through 8/5/21 after a fall with an altered level of consciousness. A head CT revealed \"hyperdense focus in the right frontal region laterally which could be an acute small hemorrhagic contusion or acute subarachnoid hemorrhage. Alternatively, could represent noncalcified meningioma. 2. Calcified meningioma left frontal lobe\". INR 2.2 and reversed with FFP and Vitamin K. Neurosurgery was consulted. Recommended no anticoagulation. Non operative. Repeat head CT stable. Required supplemental Oxygen for mild hypoxic respiratory failure. IV fluids administered or mild JIGAR. Discharged to [facility] TCU. Ambulating 80 feet with hemiwalker. Minimal assistance with transfers. Set-up for feeding and grooming. Unable to complete cognitive testing due to aphasia. Discharged home with daughter and home health.    Today's concern is:     Chronic Atrial Fibrillation and History of Sick Sinus Syndrome S/P Dual-Chamber Pacemaker Placement. INR 2.6 on 2/14/22. Previous INR and Warfarin dosing has been as follows:    1/10/22 2.4 Warfarin 2.5 mg on M and 5 mg all other days   1/24/22 2.9 Warfarin 2.5 mg on M and 5 mg all other days   2/14/22 2.6 Warfarin 2.5 mg on M and 5 mg all other days     Allergies, and PMH/PSH reviewed in [facility] today.  REVIEW OF SYSTEMS:  Limited secondary to aphasia impairment but today pt reports no concerns    Objective:   BP (!) 167/84   Pulse 83   " Temp 97  F (36.1  C)   Resp 22   Wt 59.9 kg (132 lb)   GENERAL APPEARANCE:  Alert, in no distress  ENT:  Slight right-sided facial droop  EYES:  EOM, conjunctivae, lids, pupils and irises normal  RESP:  respiratory effort and palpation of chest normal, lungs clear to auscultation , no respiratory distress  CV:  Palpation and auscultation of heart done , regular rate and rhythm, no murmur, rub, or gallop  ABDOMEN:  normal bowel sounds, soft, nontender, no hepatosplenomegaly or other masses  M/S:   RIght-sided hemiparesis  SKIN:  Inspection of skin and subcutaneous tissue baseline, Palpation of skin and subcutaneous tissue baseline  NEURO:   Cranial nerves 2-12 are normal tested and grossly at patient's baseline  PSYCH:  affect and mood normal    Labs done in SNF are in [facility]. Please refer to them using [employer] Everywhere.    Assessment/Plan:  Chronic Atrial Fibrillation and History of Sick Sinus Syndrome S/P Dual-Chamber Pacemaker Placement. Anticoagulation held due to SAH in August 2021 and high risk of falls, but has since been resumed by previous PCP. Warfarin 2.5 mg PO every day on Monday and 5 mg all other days. Recheck INR on 3/14/22. Also on Aspirin.Takes Metoprolol.     History of Falls. While attempting to get dressed on 11/20/21. No injury. VSS. Has had no other falls since that time. Patient is at high risk for falls. Staff to assist with ADLs.     Recent Subarachnoid Hemorrhage in August 2021. Following a fall.Neurosurgery recommended non-operative treatment. Held anticoagulation, but has since been resumed by previous PCP.      History of CVA with Right-Sided Weakness, Facial Droop and Expressive Aphasia in 2004. Is able to state yes or no to questions secondary to aphasia.     Hypertension. Monitor blood pressure. Takes Metoprolol. Also on Furosemide and Diurex as needed.     Hyperthyroidism. TSH 2.63, T3 68 and Free T4 1.23 on 12/23/21.Takes Methimazole.      Insomnia.  Doxylamine discontinued and Melatonin increased on 11/23/21.      Hypernatremia. Resolved. Last Sodium 144 on 12/23/21. Monitor periodically.     Constipation. Continue Miralax as ordered.     Thoracic Aortic Aneurysm. Incidental finding on 7/22/18. 4 cm in AP dimension. Will need to clarify with daughter if follow-up surveillance was performed depending upon goals of care. Left message for daughter.    Orders:  None    Electronically signed by: JENNIFER Mcgrath CNP

## 2022-02-16 NOTE — LETTER
"    2/16/2022        RE: Lilliana Mcneal  [address] [address]        [facility] GERIATRICS    Chief Complaint   Patient presents with     RECHECK     HPI:  Lilliana Mcneal is a 89 year old  (1/30/1932), who is being seen today for an episodic care visit at: [facility] [[id number]].    Background:    This is a 89-year-old female, with a past medical history significant for a CVA with right-sided hemiparesis and expressive aphasia, chronic atrial fibrillation on anticoagulation, pacemaker placement and  hypertension, who was admitted to [facility] 8/3/21 through 8/5/21 after a fall with an altered level of consciousness. A head CT revealed \"hyperdense focus in the right frontal region laterally which could be an acute small hemorrhagic contusion or acute subarachnoid hemorrhage. Alternatively, could represent noncalcified meningioma. 2. Calcified meningioma left frontal lobe\". INR 2.2 and reversed with FFP and Vitamin K. Neurosurgery was consulted. Recommended no anticoagulation. Non operative. Repeat head CT stable. Required supplemental Oxygen for mild hypoxic respiratory failure. IV fluids administered or mild JIGAR. Discharged to [facility] TCU. Ambulating 80 feet with hemiwalker. Minimal assistance with transfers. Set-up for feeding and grooming. Unable to complete cognitive testing due to aphasia. Discharged home with daughter and home health.    Today's concern is:     Chronic Atrial Fibrillation and History of Sick Sinus Syndrome S/P Dual-Chamber Pacemaker Placement. INR 2.6 on 2/14/22. Previous INR and Warfarin dosing has been as follows:    1/10/22 2.4 Warfarin 2.5 mg on M and 5 mg all other days   1/24/22 2.9 Warfarin 2.5 mg on M and 5 mg all other days   2/14/22 2.6 Warfarin 2.5 mg on M and 5 mg all other days     Allergies, and PMH/PSH reviewed in EPIC today.  REVIEW OF SYSTEMS:  Limited secondary to " aphasia impairment but today pt reports no concerns    Objective:   BP (!) 167/84   Pulse 83   Temp 97  F (36.1  C)   Resp 22   Wt 59.9 kg (132 lb)   GENERAL APPEARANCE:  Alert, in no distress  ENT:  Slight right-sided facial droop  EYES:  EOM, conjunctivae, lids, pupils and irises normal  RESP:  respiratory effort and palpation of chest normal, lungs clear to auscultation , no respiratory distress  CV:  Palpation and auscultation of heart done , regular rate and rhythm, no murmur, rub, or gallop  ABDOMEN:  normal bowel sounds, soft, nontender, no hepatosplenomegaly or other masses  M/S:   RIght-sided hemiparesis  SKIN:  Inspection of skin and subcutaneous tissue baseline, Palpation of skin and subcutaneous tissue baseline  NEURO:   Cranial nerves 2-12 are normal tested and grossly at patient's baseline  PSYCH:  affect and mood normal    Labs done in SNF are in [place] EPIC. Please refer to them using [employer]/Care Everywhere.    Assessment/Plan:  Chronic Atrial Fibrillation and History of Sick Sinus Syndrome S/P Dual-Chamber Pacemaker Placement. Anticoagulation held due to SAH in August 2021 and high risk of falls, but has since been resumed by previous PCP. Warfarin 2.5 mg PO every day on Monday and 5 mg all other days. Recheck INR on 3/14/22. Also on Aspirin.Takes Metoprolol.     History of Falls. While attempting to get dressed on 11/20/21. No injury. VSS. Has had no other falls since that time. Patient is at high risk for falls. Staff to assist with ADLs.     Recent Subarachnoid Hemorrhage in August 2021. Following a fall.Neurosurgery recommended non-operative treatment. Held anticoagulation, but has since been resumed by previous PCP.      History of CVA with Right-Sided Weakness, Facial Droop and Expressive Aphasia in 2004. Is able to state yes or no to questions secondary to aphasia.     Hypertension. Monitor blood pressure. Takes Metoprolol. Also on Furosemide and Diurex as needed.     Hyperthyroidism. TSH  2.63, T3 68 and Free T4 1.23 on 12/23/21.Takes Methimazole.      Insomnia. Doxylamine discontinued and Melatonin increased on 11/23/21.      Hypernatremia. Resolved. Last Sodium 144 on 12/23/21. Monitor periodically.     Constipation. Continue Miralax as ordered.     Thoracic Aortic Aneurysm. Incidental finding on 7/22/18. 4 cm in AP dimension. Will need to clarify with daughter if follow-up surveillance was performed depending upon goals of care. Left message for daughter.    Orders:  None    Electronically signed by: JENNIFER Mcgrath CNP             Sincerely,        JENNIFER Mcgrath CNP

## 2022-02-25 ENCOUNTER — TELEPHONE (OUTPATIENT)
Dept: GERIATRICS | Facility: CLINIC | Age: 87
End: 2022-02-25
Payer: MEDICARE

## 2022-02-25 NOTE — TELEPHONE ENCOUNTER
"[place] GERIATRIC SERVICES TELEPHONE ENCOUNTER    Chief Complaint   Patient presents with     Derm Problem     Lilliana Mcneal is a 89 year old  (1/30/1932) Per staff report via written communication \"Lilliana had a skin tear to her lower left leg that is now scabbed and is 3cm in diameter. Appears she has cellulitis, as it is red around the scab about 7cm in diameter, and is painful to the touch. She does not have a fever\"    Orders:    Keflex 500 mg PO QID x 5 Days Dx: Cellulitis    Electronically signed by:   JENNIFER Mcgrath CNP    "

## 2022-03-02 ENCOUNTER — ASSISTED LIVING VISIT (OUTPATIENT)
Dept: GERIATRICS | Facility: CLINIC | Age: 87
End: 2022-03-02
Payer: MEDICARE

## 2022-03-02 VITALS
HEART RATE: 83 BPM | WEIGHT: 132 LBS | RESPIRATION RATE: 22 BRPM | DIASTOLIC BLOOD PRESSURE: 84 MMHG | TEMPERATURE: 95.7 F | SYSTOLIC BLOOD PRESSURE: 167 MMHG

## 2022-03-02 DIAGNOSIS — L03.116 CELLULITIS OF LEFT LOWER EXTREMITY: Primary | ICD-10-CM

## 2022-03-02 DIAGNOSIS — M62.81 GENERALIZED MUSCLE WEAKNESS: ICD-10-CM

## 2022-03-02 NOTE — LETTER
"    3/2/2022        RE: Lilliana Mcneal  [address] [address]        [facility] GERIATRICS    Chief Complaint   Patient presents with     RECHECK     HPI:  Lilliana Mcneal is a 89 year old  (1/30/1932), who is being seen today for an episodic care visit at: [facility] ([facility]) [[id number]].     Background:    This is a 89-year-old female, with a past medical history significant for a CVA with right-sided hemiparesis and expressive aphasia, chronic atrial fibrillation on anticoagulation, pacemaker placement and  hypertension, who was admitted to [facility] 8/3/21 through 8/5/21 after a fall with an altered level of consciousness. A head CT revealed \"hyperdense focus in the right frontal region laterally which could be an acute small hemorrhagic contusion or acute subarachnoid hemorrhage. Alternatively, could represent noncalcified meningioma. 2. Calcified meningioma left frontal lobe\". INR 2.2 and reversed with FFP and Vitamin K. Neurosurgery was consulted. Recommended no anticoagulation. Non operative. Repeat head CT stable. Required supplemental Oxygen for mild hypoxic respiratory failure. IV fluids administered or mild JIGAR. Discharged to [facility] TCU. Ambulating 80 feet with hemiwalker. Minimal assistance with transfers. Set-up for feeding and grooming. Unable to complete cognitive testing due to aphasia. Discharged home with daughter and home health.    Today's concern is:     Left Leg Cellulitis. On 2/25/22, noted to have a lower left leg that started out as a skin tear. Developed a scab surrounded by redness. Painful. Ordered Keflex 500 mg PO QID x 5 days. Today, patient denies pain. Acknowledges that redness has improved surrounding the scab.    Weakness. Noted to be requiring more assistance with toileting and having a more difficult time walking. Question if related to her lower extremity edema. Today, " patient feels it would be beneficial to work with therapy. Continues to use the stationary bike in her room.     Allergies, and PMH/PSH reviewed in EPIC today.  REVIEW OF SYSTEMS:  Limited secondary to aphasia impairment but today pt reports no concerns    Objective:   BP (!) 167/84   Pulse 83   Temp (!) 95.7  F (35.4  C)   Resp 22   Wt 59.9 kg (132 lb)   GENERAL APPEARANCE:  Alert, in no distress  ENT:  Slight right-sided facial droop  EYES:  EOM, conjunctivae, lids, pupils and irises normal  RESP:  respiratory effort and palpation of chest normal, lungs clear to auscultation , no respiratory distress  CV:  Palpation and auscultation of heart done, regular rate and rhythm, no murmur, rub, or gallop  ABDOMEN:  normal bowel sounds, soft, nontender, no hepatosplenomegaly or other masses  M/S:   RIght-sided hemiparesis  SKIN:  Dark scab surrounded by minimal mild erythema within the outline of a faded marker   NEURO:   Cranial nerves 2-12 are normal tested and grossly at patient's baseline  PSYCH:  affect and mood normal    Labs done in SNF are in [facility]. Please refer to them using [employer]/Care Everywhere.    Assessment/Plan:  Left Leg Cellulitis. Resolved. Last dose of Cephalexin 3/2/22. Monitor dark scab on left leg as it heals.    Weakness. Due to increased assistance with transfers and increased difficulty with ambulation, will order Home Physical and Occupational Therapy.     Chronic Atrial Fibrillation and History of Sick Sinus Syndrome S/P Dual-Chamber Pacemaker Placement. Anticoagulation held due to SAH in August 2021 and high risk of falls, but has since been resumed by previous PCP. Warfarin 2.5 mg PO every day on Monday and 5 mg all other days. Recheck INR on 3/14/22. Also on Aspirin.Takes Metoprolol.      Recent Subarachnoid Hemorrhage in August 2021. Following a fall.Neurosurgery recommended non-operative treatment. Held anticoagulation, but has since been resumed by previous PCP.      History of  CVA with Right-Sided Weakness, Facial Droop and Expressive Aphasia in 2004. Is able to state yes or no to questions secondary to aphasia.     Hypertension. Monitor blood pressure. Takes Metoprolol. Also on Furosemide and Diurex as needed.     Hyperthyroidism. TSH 2.63, T3 68 and Free T4 1.23 on 12/23/21.Takes Methimazole.      Insomnia. Doxylamine discontinued and Melatonin increased on 11/23/21.      Hypernatremia. Resolved. Last Sodium 144 on 12/23/21. Monitor periodically.     Constipation. Continue Miralax as ordered.     Thoracic Aortic Aneurysm. Incidental finding on 7/22/18. 4 cm in AP dimension. Will need to clarify with daughter if follow-up surveillance was performed depending upon goals of care. Previously left message for daughter.    Orders:  Ok for Home PT and OT    Electronically signed by: JENNIFER Mcgrath CNP             Sincerely,        JENNIFER Mcgrath CNP

## 2022-03-02 NOTE — PROGRESS NOTES
"[facility] GERIATRICS    Chief Complaint   Patient presents with     RECHECK     HPI:  Lilliana Mcneal is a 89 year old  (1/30/1932), who is being seen today for an episodic care visit at: [facility] [[id number]].     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 A head CT revealed \"hyperdense focus in the right frontal region laterally which could be an acute small hemorrhagic contusion or acute subarachnoid hemorrhage. Alternatively, could represent noncalcified meningioma. 2. Calcified meningioma left frontal lobe\". INR 2.2 and reversed with FFP and Vitamin K. Neurosurgery was consulted. Recommended no anticoagulation. Non operative. Repeat head CT stable. Required supplemental Oxygen for mild hypoxic respiratory failure. IV fluids administered or mild JIGAR. Discharged to [facility]. Ambulating 80 feet with hemiwalker. Minimal assistance with transfers. Set-up for feeding and grooming. Unable to complete cognitive testing due to aphasia. Discharged home with daughter and home health.    Today's concern is:     Left Leg Cellulitis. On 2/25/22, noted to have a lower left leg that started out as a skin tear. Developed a scab surrounded by redness. Painful. Ordered Keflex 500 mg PO QID x 5 days. Today, patient denies pain. Acknowledges that redness has improved surrounding the scab.    Weakness. Noted to be requiring more assistance with toileting and having a more difficult time walking. Question if related to her lower extremity edema. Today, patient feels it would be beneficial to work with therapy. Continues to use the stationary bike in her " room.     Allergies, and PMH/PSH reviewed in EPIC today.  REVIEW OF SYSTEMS:  Limited secondary to aphasia impairment but today pt reports no concerns    Objective:   BP (!) 167/84   Pulse 83   Temp (!) 95.7  F (35.4  C)   Resp 22   Wt 59.9 kg (132 lb)   GENERAL APPEARANCE:  Alert, in no distress  ENT:  Slight right-sided facial droop  EYES:  EOM, conjunctivae, lids, pupils and irises normal  RESP:  respiratory effort and palpation of chest normal, lungs clear to auscultation , no respiratory distress  CV:  Palpation and auscultation of heart done, regular rate and rhythm, no murmur, rub, or gallop  ABDOMEN:  normal bowel sounds, soft, nontender, no hepatosplenomegaly or other masses  M/S:   RIght-sided hemiparesis  SKIN:  Dark scab surrounded by minimal mild erythema within the outline of a faded marker   NEURO:   Cranial nerves 2-12 are normal tested and grossly at patient's baseline  PSYCH:  affect and mood normal    Labs done in SNF are in [facility]. Please refer to them using [employer] Everywhere.    Assessment/Plan:  Left Leg Cellulitis. Resolved. Last dose of Cephalexin 3/2/22. Monitor dark scab on left leg as it heals.    Weakness. Due to increased assistance with transfers and increased difficulty with ambulation, will order Home Physical and Occupational Therapy.     Chronic Atrial Fibrillation and History of Sick Sinus Syndrome S/P Dual-Chamber Pacemaker Placement. Anticoagulation held due to SAH in August 2021 and high risk of falls, but has since been resumed by previous PCP. Warfarin 2.5 mg PO every day on Monday and 5 mg all other days. Recheck INR on 3/14/22. Also on Aspirin.Takes Metoprolol.      Recent Subarachnoid Hemorrhage in August 2021. Following a fall.Neurosurgery recommended non-operative treatment. Held anticoagulation, but has since been resumed by previous PCP.      History of CVA with Right-Sided Weakness, Facial Droop and Expressive Aphasia in 2004. Is able to state yes or no  to questions secondary to aphasia.     Hypertension. Monitor blood pressure. Takes Metoprolol. Also on Furosemide and Diurex as needed.     Hyperthyroidism. TSH 2.63, T3 68 and Free T4 1.23 on 12/23/21.Takes Methimazole.      Insomnia. Doxylamine discontinued and Melatonin increased on 11/23/21.      Hypernatremia. Resolved. Last Sodium 144 on 12/23/21. Monitor periodically.     Constipation. Continue Miralax as ordered.     Thoracic Aortic Aneurysm. Incidental finding on 7/22/18. 4 cm in AP dimension. Will need to clarify with daughter if follow-up surveillance was performed depending upon goals of care. Previously left message for daughter.    Orders:  Ok for Home PT and OT    Electronically signed by: JENNIFER Mcgrath CNP

## 2022-03-06 ENCOUNTER — HEALTH MAINTENANCE LETTER (OUTPATIENT)
Age: 87
End: 2022-03-06

## 2022-03-14 ENCOUNTER — TELEPHONE (OUTPATIENT)
Dept: GERIATRICS | Facility: CLINIC | Age: 87
End: 2022-03-14
Payer: MEDICARE

## 2022-03-14 NOTE — TELEPHONE ENCOUNTER
[facility] TELEPHONE ENCOUNTER    Chief Complaint   Patient presents with     INR RESULTS     Lilliana Mcneal is a 89 year old  (1/30/1932) Today, INR 3.2. Previous INR and Warfarin dosing has been as follows:    1/10/22 2.4 Warfarin 2.5 mg on M and 5 mg all other days   1/24/22 2.9 Warfarin 2.5 mg on M and 5 mg all other days   2/14/22 2.6 Warfarin 2.5 mg on M and 5 mg all other days   3/14/22 3.2 Warfarin 2.5 mg on M, Th and 5 mg all other days     ASSESSMENT/PLAN  Atrial Fibrillation    Warfarin 2.5 mg on M, Th and 5 mg all other days    INR on 3/25/22    Electronically signed by:   JENNIFER Mcgrath CNP

## 2022-03-25 ENCOUNTER — TELEPHONE (OUTPATIENT)
Dept: GERIATRICS | Facility: CLINIC | Age: 87
End: 2022-03-25
Payer: MEDICARE

## 2022-03-25 NOTE — TELEPHONE ENCOUNTER
[place] [facility] SERVICES     Chief Complaint   Patient presents with     INR RESULTS     Lilliana Mcneal is a 89 year old  (1/30/1932). Today, INR 2.5. Previous INR and Warfarin dosing has been as follows:    1/10/22 2.4 Warfarin 2.5 mg on M and 5 mg all other days   1/24/22 2.9 Warfarin 2.5 mg on M and 5 mg all other days   2/14/22 2.6 Warfarin 2.5 mg on M and 5 mg all other days   3/14/22 3.2 Warfarin 2.5 mg on M, Th and 5 mg all other days   3/25/22 2.5 Warfarin 2.5 mg on M, Th and 5 mg all other days     ASSESSMENT/PLAN  Atrial Fibrillation    Continue Warfarin 2.5 mg on M, Th and 5 mg all other days    Recheck INR in 2 weeks    Electronically signed by:   JENNIFER Mcgrath CNP

## 2022-04-04 ENCOUNTER — TELEPHONE (OUTPATIENT)
Dept: GERIATRICS | Facility: CLINIC | Age: 87
End: 2022-04-04
Payer: MEDICARE

## 2022-04-04 NOTE — TELEPHONE ENCOUNTER
[facility] TELEPHONE ENCOUNTER    Chief Complaint   Patient presents with     Discharge Summary     Lilliana Mcneal is a 89 year old  (1/30/1932)    Received report from staff patient discharged from [facility] to a different [facility] on 3/29/22 to be closer to family.     Electronically signed by:   JENNIFER Mcgrath CNP

## 2022-05-11 ENCOUNTER — LAB REQUISITION (OUTPATIENT)
Dept: LAB | Facility: CLINIC | Age: 87
End: 2022-05-11
Payer: MEDICARE

## 2022-05-11 DIAGNOSIS — D05.90 UNSPECIFIED TYPE OF CARCINOMA IN SITU OF UNSPECIFIED BREAST: ICD-10-CM

## 2022-05-12 LAB
ALBUMIN SERPL-MCNC: 3.4 G/DL (ref 3.5–5)
ALP SERPL-CCNC: 79 U/L (ref 45–120)
ALT SERPL W P-5'-P-CCNC: 28 U/L (ref 0–45)
ANION GAP SERPL CALCULATED.3IONS-SCNC: 12 MMOL/L (ref 5–18)
AST SERPL W P-5'-P-CCNC: 37 U/L (ref 0–40)
BILIRUB SERPL-MCNC: 1 MG/DL (ref 0–1)
BUN SERPL-MCNC: 27 MG/DL (ref 8–28)
CALCIUM SERPL-MCNC: 9.4 MG/DL (ref 8.5–10.5)
CHLORIDE BLD-SCNC: 104 MMOL/L (ref 98–107)
CO2 SERPL-SCNC: 26 MMOL/L (ref 22–31)
CREAT SERPL-MCNC: 0.99 MG/DL (ref 0.6–1.1)
ERYTHROCYTE [DISTWIDTH] IN BLOOD BY AUTOMATED COUNT: 14.2 % (ref 10–15)
GFR SERPL CREATININE-BSD FRML MDRD: 54 ML/MIN/1.73M2
GLUCOSE BLD-MCNC: 197 MG/DL (ref 70–125)
HCT VFR BLD AUTO: 44.1 % (ref 35–47)
HGB BLD-MCNC: 14 G/DL (ref 11.7–15.7)
MCH RBC QN AUTO: 31.8 PG (ref 26.5–33)
MCHC RBC AUTO-ENTMCNC: 31.7 G/DL (ref 31.5–36.5)
MCV RBC AUTO: 100 FL (ref 78–100)
PLATELET # BLD AUTO: 227 10E3/UL (ref 150–450)
POTASSIUM BLD-SCNC: 4 MMOL/L (ref 3.5–5)
PROT SERPL-MCNC: 6.9 G/DL (ref 6–8)
RBC # BLD AUTO: 4.4 10E6/UL (ref 3.8–5.2)
SODIUM SERPL-SCNC: 142 MMOL/L (ref 136–145)
T4 FREE SERPL-MCNC: 1.02 NG/DL (ref 0.7–1.8)
TSH SERPL DL<=0.005 MIU/L-ACNC: 3.51 UIU/ML (ref 0.3–5)
WBC # BLD AUTO: 6.2 10E3/UL (ref 4–11)

## 2022-05-12 PROCEDURE — 36415 COLL VENOUS BLD VENIPUNCTURE: CPT | Mod: ORL | Performed by: NURSE PRACTITIONER

## 2022-05-12 PROCEDURE — 80053 COMPREHEN METABOLIC PANEL: CPT | Mod: ORL | Performed by: NURSE PRACTITIONER

## 2022-05-12 PROCEDURE — P9604 ONE-WAY ALLOW PRORATED TRIP: HCPCS | Mod: ORL | Performed by: NURSE PRACTITIONER

## 2022-05-12 PROCEDURE — 85027 COMPLETE CBC AUTOMATED: CPT | Mod: ORL | Performed by: NURSE PRACTITIONER

## 2022-05-12 PROCEDURE — 84443 ASSAY THYROID STIM HORMONE: CPT | Mod: ORL | Performed by: NURSE PRACTITIONER

## 2022-05-12 PROCEDURE — 84439 ASSAY OF FREE THYROXINE: CPT | Mod: ORL | Performed by: NURSE PRACTITIONER

## 2022-07-25 DIAGNOSIS — K59.00 CONSTIPATION, UNSPECIFIED CONSTIPATION TYPE: ICD-10-CM

## 2022-07-25 RX ORDER — POLYETHYLENE GLYCOL 3350 17 G/17G
POWDER, FOR SOLUTION ORAL
Qty: 510 G | Refills: 11 | Status: SHIPPED | OUTPATIENT
Start: 2022-07-25

## 2022-07-25 RX ORDER — POLYETHYLENE GLYCOL 3350 17 G/17G
POWDER, FOR SOLUTION ORAL
Qty: 510 G | Refills: 11 | OUTPATIENT
Start: 2022-07-25

## 2022-11-16 ENCOUNTER — LAB REQUISITION (OUTPATIENT)
Dept: LAB | Facility: CLINIC | Age: 87
End: 2022-11-16
Payer: MEDICARE

## 2022-11-16 DIAGNOSIS — E88.09 OTHER DISORDERS OF PLASMA-PROTEIN METABOLISM, NOT ELSEWHERE CLASSIFIED: ICD-10-CM

## 2022-11-16 DIAGNOSIS — N18.31 CHRONIC KIDNEY DISEASE, STAGE 3A (H): ICD-10-CM

## 2022-11-16 DIAGNOSIS — E05.90 THYROTOXICOSIS, UNSPECIFIED WITHOUT THYROTOXIC CRISIS OR STORM: ICD-10-CM

## 2022-11-17 LAB
ALBUMIN SERPL BCG-MCNC: 3.8 G/DL (ref 3.5–5.2)
ALP SERPL-CCNC: 84 U/L (ref 35–104)
ALT SERPL W P-5'-P-CCNC: 17 U/L (ref 10–35)
ANION GAP SERPL CALCULATED.3IONS-SCNC: 10 MMOL/L (ref 7–15)
AST SERPL W P-5'-P-CCNC: 27 U/L (ref 10–35)
BILIRUB SERPL-MCNC: 0.7 MG/DL
BUN SERPL-MCNC: 19.3 MG/DL (ref 8–23)
CALCIUM SERPL-MCNC: 9.6 MG/DL (ref 8.2–9.6)
CHLORIDE SERPL-SCNC: 103 MMOL/L (ref 98–107)
CREAT SERPL-MCNC: 0.67 MG/DL (ref 0.51–0.95)
DEPRECATED CALCIDIOL+CALCIFEROL SERPL-MC: 41 UG/L (ref 20–75)
DEPRECATED HCO3 PLAS-SCNC: 26 MMOL/L (ref 22–29)
GFR SERPL CREATININE-BSD FRML MDRD: 83 ML/MIN/1.73M2
GLUCOSE SERPL-MCNC: 112 MG/DL (ref 70–99)
POTASSIUM SERPL-SCNC: 4.2 MMOL/L (ref 3.4–5.3)
PROT SERPL-MCNC: 6.6 G/DL (ref 6.4–8.3)
SODIUM SERPL-SCNC: 139 MMOL/L (ref 136–145)
T4 FREE SERPL-MCNC: 1.11 NG/DL (ref 0.9–1.7)
TSH SERPL DL<=0.005 MIU/L-ACNC: 4.44 UIU/ML (ref 0.3–4.2)

## 2022-11-17 PROCEDURE — 82306 VITAMIN D 25 HYDROXY: CPT | Mod: ORL | Performed by: FAMILY MEDICINE

## 2022-11-17 PROCEDURE — 84439 ASSAY OF FREE THYROXINE: CPT | Mod: ORL | Performed by: FAMILY MEDICINE

## 2022-11-17 PROCEDURE — 80053 COMPREHEN METABOLIC PANEL: CPT | Mod: ORL | Performed by: FAMILY MEDICINE

## 2022-11-17 PROCEDURE — 36415 COLL VENOUS BLD VENIPUNCTURE: CPT | Mod: ORL | Performed by: FAMILY MEDICINE

## 2022-11-17 PROCEDURE — P9604 ONE-WAY ALLOW PRORATED TRIP: HCPCS | Mod: ORL | Performed by: FAMILY MEDICINE

## 2022-11-17 PROCEDURE — 84443 ASSAY THYROID STIM HORMONE: CPT | Mod: ORL | Performed by: FAMILY MEDICINE

## 2022-11-21 ENCOUNTER — HEALTH MAINTENANCE LETTER (OUTPATIENT)
Age: 87
End: 2022-11-21

## 2022-11-25 ENCOUNTER — LAB REQUISITION (OUTPATIENT)
Dept: LAB | Facility: CLINIC | Age: 87
End: 2022-11-25
Payer: MEDICARE

## 2022-11-25 LAB
FLUAV RNA SPEC QL NAA+PROBE: NEGATIVE
FLUBV RNA RESP QL NAA+PROBE: NEGATIVE
RSV RNA SPEC NAA+PROBE: POSITIVE
SARS-COV-2 RNA RESP QL NAA+PROBE: NEGATIVE

## 2022-11-25 PROCEDURE — 87637 SARSCOV2&INF A&B&RSV AMP PRB: CPT | Mod: ORL | Performed by: FAMILY MEDICINE

## 2023-04-05 ENCOUNTER — LAB REQUISITION (OUTPATIENT)
Dept: LAB | Facility: CLINIC | Age: 88
End: 2023-04-05
Payer: MEDICARE

## 2023-04-05 DIAGNOSIS — E87.0 HYPEROSMOLALITY AND HYPERNATREMIA: ICD-10-CM

## 2023-04-05 DIAGNOSIS — E88.09 OTHER DISORDERS OF PLASMA-PROTEIN METABOLISM, NOT ELSEWHERE CLASSIFIED: ICD-10-CM

## 2023-04-06 LAB
ALBUMIN SERPL BCG-MCNC: 3.7 G/DL (ref 3.5–5.2)
ALP SERPL-CCNC: 102 U/L (ref 35–104)
ALT SERPL W P-5'-P-CCNC: 19 U/L (ref 10–35)
ANION GAP SERPL CALCULATED.3IONS-SCNC: 12 MMOL/L (ref 7–15)
AST SERPL W P-5'-P-CCNC: 27 U/L (ref 10–35)
BILIRUB SERPL-MCNC: 0.5 MG/DL
BUN SERPL-MCNC: 20.7 MG/DL (ref 8–23)
CALCIUM SERPL-MCNC: 9.1 MG/DL (ref 8.2–9.6)
CHLORIDE SERPL-SCNC: 106 MMOL/L (ref 98–107)
CREAT SERPL-MCNC: 0.77 MG/DL (ref 0.51–0.95)
DEPRECATED HCO3 PLAS-SCNC: 24 MMOL/L (ref 22–29)
GFR SERPL CREATININE-BSD FRML MDRD: 72 ML/MIN/1.73M2
GLUCOSE SERPL-MCNC: 125 MG/DL (ref 70–99)
MAGNESIUM SERPL-MCNC: 1.6 MG/DL (ref 1.7–2.3)
POTASSIUM SERPL-SCNC: 4.5 MMOL/L (ref 3.4–5.3)
PROT SERPL-MCNC: 6.5 G/DL (ref 6.4–8.3)
SODIUM SERPL-SCNC: 142 MMOL/L (ref 136–145)

## 2023-04-06 PROCEDURE — 83735 ASSAY OF MAGNESIUM: CPT | Mod: ORL | Performed by: PEDIATRICS

## 2023-04-06 PROCEDURE — 36415 COLL VENOUS BLD VENIPUNCTURE: CPT | Mod: ORL | Performed by: PEDIATRICS

## 2023-04-06 PROCEDURE — 80053 COMPREHEN METABOLIC PANEL: CPT | Mod: ORL | Performed by: PEDIATRICS

## 2023-04-06 PROCEDURE — P9604 ONE-WAY ALLOW PRORATED TRIP: HCPCS | Mod: ORL | Performed by: PEDIATRICS

## 2023-04-16 ENCOUNTER — HEALTH MAINTENANCE LETTER (OUTPATIENT)
Age: 88
End: 2023-04-16

## 2023-05-24 ENCOUNTER — LAB REQUISITION (OUTPATIENT)
Dept: LAB | Facility: CLINIC | Age: 88
End: 2023-05-24
Payer: MEDICARE

## 2023-05-24 DIAGNOSIS — N18.31 CHRONIC KIDNEY DISEASE, STAGE 3A (H): ICD-10-CM

## 2023-05-24 DIAGNOSIS — E83.42 HYPOMAGNESEMIA: ICD-10-CM

## 2023-05-25 LAB
ALBUMIN SERPL BCG-MCNC: 3.8 G/DL (ref 3.5–5.2)
ALP SERPL-CCNC: 98 U/L (ref 35–104)
ALT SERPL W P-5'-P-CCNC: 19 U/L (ref 10–35)
ANION GAP SERPL CALCULATED.3IONS-SCNC: 12 MMOL/L (ref 7–15)
AST SERPL W P-5'-P-CCNC: 33 U/L (ref 10–35)
BILIRUB SERPL-MCNC: 0.6 MG/DL
BUN SERPL-MCNC: 20.3 MG/DL (ref 8–23)
CALCIUM SERPL-MCNC: 9.3 MG/DL (ref 8.2–9.6)
CHLORIDE SERPL-SCNC: 105 MMOL/L (ref 98–107)
CREAT SERPL-MCNC: 0.59 MG/DL (ref 0.51–0.95)
DEPRECATED HCO3 PLAS-SCNC: 27 MMOL/L (ref 22–29)
GFR SERPL CREATININE-BSD FRML MDRD: 85 ML/MIN/1.73M2
GLUCOSE SERPL-MCNC: 91 MG/DL (ref 70–99)
MAGNESIUM SERPL-MCNC: 1.8 MG/DL (ref 1.7–2.3)
POTASSIUM SERPL-SCNC: 4.1 MMOL/L (ref 3.4–5.3)
PROT SERPL-MCNC: 7 G/DL (ref 6.4–8.3)
SODIUM SERPL-SCNC: 144 MMOL/L (ref 136–145)

## 2023-05-25 PROCEDURE — 83735 ASSAY OF MAGNESIUM: CPT | Mod: ORL | Performed by: FAMILY MEDICINE

## 2023-05-25 PROCEDURE — 80053 COMPREHEN METABOLIC PANEL: CPT | Mod: ORL | Performed by: FAMILY MEDICINE

## 2023-05-25 PROCEDURE — P9604 ONE-WAY ALLOW PRORATED TRIP: HCPCS | Mod: ORL | Performed by: FAMILY MEDICINE

## 2023-05-25 PROCEDURE — 36415 COLL VENOUS BLD VENIPUNCTURE: CPT | Mod: ORL | Performed by: FAMILY MEDICINE

## 2024-04-17 ENCOUNTER — DOCUMENTATION ONLY (OUTPATIENT)
Dept: OTHER | Facility: CLINIC | Age: 89
End: 2024-04-17
Payer: MEDICARE

## 2024-06-23 ENCOUNTER — HEALTH MAINTENANCE LETTER (OUTPATIENT)
Age: 89
End: 2024-06-23

## 2024-07-16 DIAGNOSIS — K59.00 CONSTIPATION, UNSPECIFIED CONSTIPATION TYPE: ICD-10-CM

## 2024-07-17 RX ORDER — POLYETHYLENE GLYCOL 3350 17 G/17G
POWDER, FOR SOLUTION ORAL
Qty: 510 G | Refills: 11 | OUTPATIENT
Start: 2024-07-17